# Patient Record
Sex: MALE | Race: WHITE | NOT HISPANIC OR LATINO | Employment: FULL TIME | URBAN - METROPOLITAN AREA
[De-identification: names, ages, dates, MRNs, and addresses within clinical notes are randomized per-mention and may not be internally consistent; named-entity substitution may affect disease eponyms.]

---

## 2017-08-05 ENCOUNTER — HOSPITAL ENCOUNTER (EMERGENCY)
Facility: HOSPITAL | Age: 42
Discharge: HOME | End: 2017-08-05
Admitting: EMERGENCY MEDICINE
Payer: COMMERCIAL

## 2017-08-05 VITALS
OXYGEN SATURATION: 99 % | SYSTOLIC BLOOD PRESSURE: 121 MMHG | BODY MASS INDEX: 24.33 KG/M2 | HEART RATE: 68 BPM | WEIGHT: 155 LBS | DIASTOLIC BLOOD PRESSURE: 68 MMHG | TEMPERATURE: 97.6 F | RESPIRATION RATE: 16 BRPM | HEIGHT: 67 IN

## 2017-08-05 DIAGNOSIS — L29.9 ITCHY SKIN: ICD-10-CM

## 2017-08-05 DIAGNOSIS — L30.9 DERMATITIS: Primary | ICD-10-CM

## 2017-08-05 PROCEDURE — 99282 EMERGENCY DEPT VISIT SF MDM: CPT

## 2017-08-05 RX ORDER — PREDNISONE 10 MG/1
TABLET ORAL
Qty: 30 TABLET | Refills: 0 | Status: SHIPPED | OUTPATIENT
Start: 2017-08-05

## 2017-08-05 RX ORDER — DIPHENHYDRAMINE HCL 25 MG
25 TABLET ORAL EVERY 6 HOURS PRN
Qty: 30 TABLET | Refills: 0 | Status: SHIPPED | OUTPATIENT
Start: 2017-08-05

## 2018-07-16 ENCOUNTER — HOSPITAL ENCOUNTER (EMERGENCY)
Facility: HOSPITAL | Age: 43
Discharge: HOME | End: 2018-07-17
Attending: EMERGENCY MEDICINE | Admitting: EMERGENCY MEDICINE
Payer: COMMERCIAL

## 2018-07-16 VITALS
HEART RATE: 67 BPM | WEIGHT: 165 LBS | DIASTOLIC BLOOD PRESSURE: 73 MMHG | TEMPERATURE: 98.1 F | BODY MASS INDEX: 25.84 KG/M2 | RESPIRATION RATE: 18 BRPM | SYSTOLIC BLOOD PRESSURE: 147 MMHG | OXYGEN SATURATION: 100 %

## 2018-07-16 DIAGNOSIS — H60.02 ABSCESS OF EXTERNAL EAR, LEFT: Primary | ICD-10-CM

## 2018-07-17 ENCOUNTER — APPOINTMENT (EMERGENCY)
Dept: RADIOLOGY | Facility: HOSPITAL | Age: 43
End: 2018-07-17
Payer: COMMERCIAL

## 2018-07-17 LAB
ANION GAP SERPL CALCULATED.3IONS-SCNC: 6 MMOL/L (ref 4–13)
BASOPHILS # BLD AUTO: 0.09 THOUSANDS/ΜL (ref 0–0.1)
BASOPHILS NFR BLD AUTO: 1 % (ref 0–1)
BUN SERPL-MCNC: 17 MG/DL (ref 5–25)
CALCIUM SERPL-MCNC: 8.6 MG/DL (ref 8.3–10.1)
CHLORIDE SERPL-SCNC: 105 MMOL/L (ref 100–108)
CO2 SERPL-SCNC: 28 MMOL/L (ref 21–32)
CREAT SERPL-MCNC: 1.05 MG/DL (ref 0.6–1.3)
EOSINOPHIL # BLD AUTO: 0.41 THOUSAND/ΜL (ref 0–0.61)
EOSINOPHIL NFR BLD AUTO: 4 % (ref 0–6)
ERYTHROCYTE [DISTWIDTH] IN BLOOD BY AUTOMATED COUNT: 13.2 % (ref 11.6–15.1)
GFR SERPL CREATININE-BSD FRML MDRD: 88 ML/MIN/1.73SQ M
GLUCOSE SERPL-MCNC: 110 MG/DL (ref 65–140)
HCT VFR BLD AUTO: 41.4 % (ref 36.5–49.3)
HGB BLD-MCNC: 13.9 G/DL (ref 12–17)
IMM GRANULOCYTES # BLD AUTO: 0.03 THOUSAND/UL (ref 0–0.2)
IMM GRANULOCYTES NFR BLD AUTO: 0 % (ref 0–2)
LYMPHOCYTES # BLD AUTO: 4.71 THOUSANDS/ΜL (ref 0.6–4.47)
LYMPHOCYTES NFR BLD AUTO: 46 % (ref 14–44)
MCH RBC QN AUTO: 31.2 PG (ref 26.8–34.3)
MCHC RBC AUTO-ENTMCNC: 33.6 G/DL (ref 31.4–37.4)
MCV RBC AUTO: 93 FL (ref 82–98)
MONOCYTES # BLD AUTO: 0.8 THOUSAND/ΜL (ref 0.17–1.22)
MONOCYTES NFR BLD AUTO: 8 % (ref 4–12)
NEUTROPHILS # BLD AUTO: 4.21 THOUSANDS/ΜL (ref 1.85–7.62)
NEUTS SEG NFR BLD AUTO: 41 % (ref 43–75)
NRBC BLD AUTO-RTO: 0 /100 WBCS
PLATELET # BLD AUTO: 219 THOUSANDS/UL (ref 149–390)
PMV BLD AUTO: 9.7 FL (ref 8.9–12.7)
POTASSIUM SERPL-SCNC: 3.9 MMOL/L (ref 3.5–5.3)
RBC # BLD AUTO: 4.46 MILLION/UL (ref 3.88–5.62)
SODIUM SERPL-SCNC: 139 MMOL/L (ref 136–145)
WBC # BLD AUTO: 10.25 THOUSAND/UL (ref 4.31–10.16)

## 2018-07-17 PROCEDURE — 36415 COLL VENOUS BLD VENIPUNCTURE: CPT | Performed by: EMERGENCY MEDICINE

## 2018-07-17 PROCEDURE — 80048 BASIC METABOLIC PNL TOTAL CA: CPT | Performed by: EMERGENCY MEDICINE

## 2018-07-17 PROCEDURE — 96360 HYDRATION IV INFUSION INIT: CPT

## 2018-07-17 PROCEDURE — 85025 COMPLETE CBC W/AUTO DIFF WBC: CPT | Performed by: EMERGENCY MEDICINE

## 2018-07-17 PROCEDURE — 99284 EMERGENCY DEPT VISIT MOD MDM: CPT

## 2018-07-17 PROCEDURE — 70487 CT MAXILLOFACIAL W/DYE: CPT

## 2018-07-17 RX ORDER — SULFAMETHOXAZOLE AND TRIMETHOPRIM 800; 160 MG/1; MG/1
1 TABLET ORAL 2 TIMES DAILY
Qty: 14 TABLET | Refills: 0 | Status: SHIPPED | OUTPATIENT
Start: 2018-07-17 | End: 2018-07-24

## 2018-07-17 RX ORDER — CEPHALEXIN 500 MG/1
500 CAPSULE ORAL EVERY 6 HOURS SCHEDULED
Qty: 28 CAPSULE | Refills: 0 | Status: SHIPPED | OUTPATIENT
Start: 2018-07-17 | End: 2018-07-24

## 2018-07-17 RX ORDER — SULFAMETHOXAZOLE AND TRIMETHOPRIM 800; 160 MG/1; MG/1
1 TABLET ORAL ONCE
Status: COMPLETED | OUTPATIENT
Start: 2018-07-17 | End: 2018-07-17

## 2018-07-17 RX ORDER — CEPHALEXIN 250 MG/1
500 CAPSULE ORAL ONCE
Status: COMPLETED | OUTPATIENT
Start: 2018-07-17 | End: 2018-07-17

## 2018-07-17 RX ADMIN — SULFAMETHOXAZOLE AND TRIMETHOPRIM 1 TABLET: 800; 160 TABLET ORAL at 01:30

## 2018-07-17 RX ADMIN — CEPHALEXIN 500 MG: 250 CAPSULE ORAL at 01:30

## 2018-07-17 RX ADMIN — SODIUM CHLORIDE 1000 ML: 0.9 INJECTION, SOLUTION INTRAVENOUS at 00:11

## 2018-07-17 RX ADMIN — IOHEXOL 85 ML: 350 INJECTION, SOLUTION INTRAVENOUS at 00:52

## 2018-07-17 NOTE — DISCHARGE INSTRUCTIONS
Abscess   WHAT YOU NEED TO KNOW:   A warm compress may help your abscess drain  Your healthcare provider may make a cut in the abscess so it can drain  You may need surgery to remove an abscess that is on your hands or buttocks  DISCHARGE INSTRUCTIONS:   Return to the emergency department if:   · The area around your abscess becomes very painful, warm, or has red streaks  · You have a fever and chills  · Your heart is beating faster than usual      · You feel faint or confused  Contact your healthcare provider if:   · Your abscess gets bigger or does not get better  · Your abscess returns  · You have questions or concerns about your condition or care  Medicines: You may  need any of the following:  · Antibiotics  help treat a bacterial infection  · Acetaminophen  decreases pain and fever  It is available without a doctor's order  Ask how much to take and how often to take it  Follow directions  Acetaminophen can cause liver damage if not taken correctly  · NSAIDs , such as ibuprofen, help decrease swelling, pain, and fever  This medicine is available with or without a doctor's order  NSAIDs can cause stomach bleeding or kidney problems in certain people  If you take blood thinner medicine, always ask your healthcare provider if NSAIDs are safe for you  Always read the medicine label and follow directions  · Take your medicine as directed  Contact your healthcare provider if you think your medicine is not helping or if you have side effects  Tell him or her if you are allergic to any medicine  Keep a list of the medicines, vitamins, and herbs you take  Include the amounts, and when and why you take them  Bring the list or the pill bottles to follow-up visits  Carry your medicine list with you in case of an emergency  Self-care:   · Apply a warm compress to your abscess  This will help it open and drain  Wet a washcloth in warm, but not hot, water  Apply the compress for 10 minutes  Repeat this 4 times each day  Do not  press on an abscess or try to open it with a needle  You may push the bacteria deeper or into your blood  · Do not share your clothes, towels, or sheets with anyone  This can spread the infection to others  · Wash your hands often  This can help prevent the spread of germs  Use soap and water or an alcohol-based hand rub  Care for your wound after it is drained:   · Care for your wound as directed  If your healthcare provider says it is okay, carefully remove the bandage and gauze packing  You may need to soak the gauze to get it out of your wound  Clean your wound and the area around it as directed  Dry the area and put on new, clean bandages  Change your bandages when they get wet or dirty  · Ask your healthcare provider how to change the gauze in your wound  Keep track of how many pieces of gauze are placed inside the wound  Do not put too much packing in the wound  Do not pack the gauze too tightly in your wound  Follow up with your healthcare provider in 1 to 3 days: You may need to have your packing removed or your bandage changed  Write down your questions so you remember to ask them during your visits  © 2017 [employer]  Information is for End User's use only and may not be sold, redistributed or otherwise used for commercial purposes  All illustrations and images included in CareNotes® are the copyrighted property of [employer] , [employer]  or Brendon Sylvester  The above information is an  only  It is not intended as medical advice for individual conditions or treatments  Talk to your doctor, nurse or pharmacist before following any medical regimen to see if it is safe and effective for you

## 2018-07-17 NOTE — ED ATTENDING ATTESTATION
Lin Chapman MD, saw and evaluated the patient  I have discussed the patient with the resident/non-physician practitioner and agree with the resident's/non-physician practitioner's findings, Plan of Care, and MDM as documented in the resident's/non-physician practitioner's note, except where noted  All available labs and Radiology studies were reviewed  At this point I agree with the current assessment done in the Emergency Department  I have conducted an independent evaluation of this patient a history and physical is as follows:    OA: 37 y/o m who noticed a small "bump" behind his L ear that has grown progressively in size, painful with radiation to his forehead and into his jaw/face as well  Mildly sore throat but no difficulty swallowing  Attempted to "pop" the area a few days ago and noted increase in size since then  No fevers/chills  No drainage from ear  No neck pain otherwise  Denies IVDU or immunocompromise  + tobacco  PE, well developed m sitting upright in bed in NAD, VSS, NC/AT, MMM, clear sclera/conjunctiva, TM clear b/l, + small dime sized area of fluctuance on posterior auricle without associated erythema or warmth, no ttp over mastoid, + palpable post-auricular LN, posterior oropharynx WNL, neck supple/FROM/-meningismus, no crepitus, RR, lungs CTAB, abd soft, +Bs, -rash, GRANT, intact pulses, AAO  A/p abscess, given associated discomfort in neck/face, obtain imaging, abx, treat accordingly         Critical Care Time  CritCare Time    Procedures

## 2018-07-17 NOTE — ED PROVIDER NOTES
History  Chief Complaint   Patient presents with    Swollen Glands     pt reports swollen lymph nodes behind left ear, states they hurt and he has a left sided sore throat     Patient is a 39 yo M who presents with left ear pain and swelling behind the ear x multiple weeks  Reports that a few weeks ago noticed a small bump behind his left ear that has since been growing in size and after attempting to pop it a few days ago has become painful  Describes pain as throbbing, constant, radiating to the left forehead and down the neck, 7 out of 10 in intensity  Denies any fevers,chills, n/v, trouble swallowing, drainage from the actual ear  Assessment and Plan: left posterior ear swelling with some fluctuance concerning for abscess  As this area is throughout cartilage, hesitant to I and D  Will give patient 1st dose antibiotics in the ED, prescribe antibiotics x 7 days, and have patient follow up with ENT  Also, will get a CT scan of the mastoid- which was negative  Given strict return precautions which patient verbalized understanding of  Prior to Admission Medications   Prescriptions Last Dose Informant Patient Reported? Taking? diphenhydrAMINE (BENADRYL) 25 mg tablet   No No   Sig: Take 1 tablet by mouth every 6 (six) hours as needed for itching   predniSONE 10 mg tablet   No No   Sig: Take 5 tabs on day 1 and 2, take 4 tabs on day 3 and 4, take 3 tabs on day 5 and 6, take 2 tabs on day 7 and 8, take 1 tab on day 9 and 10      Facility-Administered Medications: None       History reviewed  No pertinent past medical history  History reviewed  No pertinent surgical history  History reviewed  No pertinent family history  I have reviewed and agree with the history as documented      Social History   Substance Use Topics    Smoking status: Current Some Day Smoker    Smokeless tobacco: Former User     Quit date: 8/5/2016    Alcohol use No        Review of Systems   Constitutional: Negative for chills and fever  HENT: Positive for ear pain and sore throat  Negative for congestion, drooling, ear discharge, rhinorrhea and trouble swallowing  Eyes: Negative for discharge and itching  Respiratory: Negative for cough, shortness of breath and wheezing  Skin: Negative for pallor and rash  Physical Exam  ED Triage Vitals [07/16/18 2050]   Temperature Pulse Respirations Blood Pressure SpO2   98 1 °F (36 7 °C) 67 18 147/73 100 %      Temp Source Heart Rate Source Patient Position - Orthostatic VS BP Location FiO2 (%)   Oral Monitor Sitting Left arm --      Pain Score       7           Orthostatic Vital Signs  Vitals:    07/16/18 2050   BP: 147/73   Pulse: 67   Patient Position - Orthostatic VS: Sitting       Physical Exam   Constitutional: He is oriented to person, place, and time  He appears well-developed and well-nourished  No distress  HENT:   Head: Normocephalic and atraumatic  Nose: Nose normal    Mouth/Throat: Oropharynx is clear and moist  No oropharyngeal exudate  Left area of fluctuance over the posterior aspect of the antihelix  No drainage  Tender over area of fluctuance  No surrounding erythema  No erythema or induration over the mastoid  No tenderness over mastoid  Eyes: Conjunctivae and EOM are normal  Pupils are equal, round, and reactive to light  Neck: Normal range of motion and full passive range of motion without pain  Neck supple  No spinous process tenderness and no muscular tenderness present  No neck rigidity  No edema, no erythema and normal range of motion present  No Brudzinski's sign and no Kernig's sign noted  Cardiovascular: Normal rate, regular rhythm, normal heart sounds and intact distal pulses  Exam reveals no gallop and no friction rub  No murmur heard  Pulmonary/Chest: Effort normal and breath sounds normal  No respiratory distress  He has no wheezes  He has no rales  He exhibits no tenderness  Abdominal: Soft   Bowel sounds are normal  He exhibits no distension  There is no tenderness  Musculoskeletal: Normal range of motion  He exhibits no edema or tenderness  Neurological: He is alert and oriented to person, place, and time  Skin: Skin is warm and dry  He is not diaphoretic  No erythema  Psychiatric: He has a normal mood and affect  His behavior is normal    Nursing note and vitals reviewed  ED Medications  Medications   sodium chloride 0 9 % bolus 1,000 mL (0 mL Intravenous Stopped 7/17/18 0125)   iohexol (OMNIPAQUE) 350 MG/ML injection (MULTI-DOSE) 85 mL (85 mL Intravenous Given 7/17/18 0052)   cephalexin (KEFLEX) capsule 500 mg (500 mg Oral Given 7/17/18 0130)   sulfamethoxazole-trimethoprim (BACTRIM DS) 800-160 mg per tablet 1 tablet (1 tablet Oral Given 7/17/18 0130)       Diagnostic Studies  Results Reviewed     Procedure Component Value Units Date/Time    Basic metabolic panel [[id number]] Collected:  07/17/18 0010    Lab Status:  Final result Specimen:  Blood from Arm, Right Updated:  07/17/18 0034     Sodium 139 mmol/L      Potassium 3 9 mmol/L      Chloride 105 mmol/L      CO2 28 mmol/L      Anion Gap 6 mmol/L      BUN 17 mg/dL      Creatinine 1 05 mg/dL      Glucose 110 mg/dL      Calcium 8 6 mg/dL      eGFR 88 ml/min/1 73sq m     Narrative:         National Kidney Disease Education Program recommendations are as follows:  GFR calculation is accurate only with a steady state creatinine  Chronic Kidney disease less than 60 ml/min/1 73 sq  meters  Kidney failure less than 15 ml/min/1 73 sq  meters      CBC and differential [[id number]]  (Abnormal) Collected:  07/17/18 0011    Lab Status:  Final result Specimen:  Blood from Arm, Right Updated:  07/17/18 0019     WBC 10 25 (H) Thousand/uL      RBC 4 46 Million/uL      Hemoglobin 13 9 g/dL      Hematocrit 41 4 %      MCV 93 fL      MCH 31 2 pg      MCHC 33 6 g/dL      RDW 13 2 %      MPV 9 7 fL      Platelets 788 Thousands/uL      nRBC 0 /100 WBCs      Neutrophils Relative 41 (L) % Immat GRANS % 0 %      Lymphocytes Relative 46 (H) %      Monocytes Relative 8 %      Eosinophils Relative 4 %      Basophils Relative 1 %      Neutrophils Absolute 4 21 Thousands/µL      Immature Grans Absolute 0 03 Thousand/uL      Lymphocytes Absolute 4 71 (H) Thousands/µL      Monocytes Absolute 0 80 Thousand/µL      Eosinophils Absolute 0 41 Thousand/µL      Basophils Absolute 0 09 Thousands/µL                  CT facial bones with contrast   Final Result by Scott Mirza DO (07/17 9703)      Partial opacification of the left frontal sinus and ethmoid air cells suggestive of sinus disease  Workstation performed: [id number]               Procedures  Procedures      Phone Consults  ED Phone Contact    ED Course                               MDM  CritCare Time    Disposition  Final diagnoses:   Abscess of external ear, left     Time reflects when diagnosis was documented in both MDM as applicable and the Disposition within this note     Time User Action Codes Description Comment    7/17/2018  1:16 AM Dung Stuart Add [H60 02] Abscess of external ear, left       ED Disposition     ED Disposition Condition Comment    Discharge  Rosa Barrios discharge to home/self care  Condition at discharge: Good        Follow-up Information     Follow up With Specialties Details Why Contact Info Additional Information    Laura Palma MD Otolaryngology Schedule an appointment as soon as possible for a visit in 3 days for re-evaluation [address] [facility]    [address] Celia Mireles DO Family Medicine Go in 1 week for re-evaluation [address] [facility]  [phone]       [facility] Emergency Department Emergency Medicine Go to for re-evaluation, If symptoms worsen, As needed [address] [facility], [address], [place], [address]          Patient's Medications   Discharge Prescriptions    CEPHALEXIN (KEFLEX) 500 MG CAPSULE    Take 1 capsule (500 mg total) by mouth every 6 (six) hours for 7 days       Start Date: 7/17/2018 End Date: 7/24/2018       Order Dose: 500 mg       Quantity: 28 capsule    Refills: 0    SULFAMETHOXAZOLE-TRIMETHOPRIM (BACTRIM DS) 800-160 MG PER TABLET    Take 1 tablet by mouth 2 (two) times a day for 7 days smx-tmp DS (BACTRIM) 800-160 mg tabs (1tab q12 D10)       Start Date: 7/17/2018 End Date: 7/24/2018       Order Dose: 1 tablet       Quantity: 14 tablet    Refills: 0     No discharge procedures on file  ED Provider  Attending physically available and evaluated Abelardo Paula I managed the patient along with the ED Attending      Electronically Signed by         Pernell Carbajal DO  07/17/18 0131

## 2018-07-23 RX ORDER — METHOCARBAMOL 750 MG/1
750 TABLET, FILM COATED ORAL 4 TIMES DAILY
COMMUNITY
Start: 2017-09-19 | End: 2018-09-20 | Stop reason: ALTCHOICE

## 2018-07-24 ENCOUNTER — OFFICE VISIT (OUTPATIENT)
Dept: FAMILY MEDICINE CLINIC | Facility: CLINIC | Age: 43
End: 2018-07-24
Payer: COMMERCIAL

## 2018-07-24 VITALS
DIASTOLIC BLOOD PRESSURE: 78 MMHG | BODY MASS INDEX: 24.23 KG/M2 | HEART RATE: 64 BPM | RESPIRATION RATE: 16 BRPM | WEIGHT: 154.38 LBS | SYSTOLIC BLOOD PRESSURE: 118 MMHG | TEMPERATURE: 98.4 F | HEIGHT: 67 IN

## 2018-07-24 DIAGNOSIS — H60.03: Primary | ICD-10-CM

## 2018-07-24 DIAGNOSIS — K13.79 DISORDER OF UVULA: ICD-10-CM

## 2018-07-24 PROCEDURE — 99203 OFFICE O/P NEW LOW 30 MIN: CPT | Performed by: NURSE PRACTITIONER

## 2018-07-24 NOTE — PROGRESS NOTES
Chief Complaint   Patient presents with   [address] [facility]     Patient present today for a lump behind his left ear  per patient he had a spider bite about 1 month ago and since then he is having this lump on and off  Per patient he is also having right ear discomfort and he can hear a pop inside his ears   Headache     Patient also complaints of on and off Headaches for the last months  Assessment/Plan:     Diagnoses and all orders for this visit:    Abscess of both external ears  -     Ambulatory Referral to Otolaryngology; Future    Disorder of uvula  -     Ambulatory Referral to Otolaryngology; Future          Subjective:      Patient ID: Guinevere Duane is a 38 y o  male  Patient was at the ER due to possible spider bite and left ear swelling and neck pain  There performed CT facial bones which was negative for abscess  He is on antibiotics  The swelling has gone down on the left but is now starting on the right  patient states has had problems in the past with left tonsil  He had an injury while eating doritos as a teenager that he acquired laceration to the uvula and the left tonsil and posterior pharynx  Never repaired or evaluated  Currently also complains of tonsil stones to the left tonsil  The following portions of the patient's history were reviewed and updated as appropriate: allergies, current medications, past family history, past medical history, past social history, past surgical history and problem list     Review of Systems   Constitutional: Negative for fatigue and fever  HENT: Negative for trouble swallowing  Ear pain on external posterior cartilage swelling  Respiratory: Negative for cough, chest tightness and shortness of breath  Musculoskeletal: Positive for myalgias and neck pain  Hematological: Positive for adenopathy           Objective:      /78 (BP Location: Right arm, Patient Position: Sitting, Cuff Size: Standard)   Pulse 64   Temp 98 4 °F (36 9 °C) (Temporal)   Resp 16   Ht 5' 7" (1 702 m)   Wt 70 kg (154 lb 6 oz)   BMI 24 18 kg/m²          Physical Exam   Constitutional: He appears well-developed and well-nourished  HENT:   Right Ear: There is swelling (external ear)  Left Ear: There is swelling (external ear )  Mouth/Throat: No oropharyngeal exudate or posterior oropharyngeal erythema  Cardiovascular: Normal rate, regular rhythm, S1 normal, S2 normal and normal heart sounds  No murmur heard  Pulmonary/Chest: Effort normal and breath sounds normal    Lymphadenopathy:     He has cervical adenopathy  Left cervical: Superficial cervical adenopathy present

## 2018-09-20 ENCOUNTER — OFFICE VISIT (OUTPATIENT)
Dept: MULTI SPECIALTY CLINIC | Facility: CLINIC | Age: 43
End: 2018-09-20
Payer: COMMERCIAL

## 2018-09-20 VITALS
DIASTOLIC BLOOD PRESSURE: 84 MMHG | WEIGHT: 159.61 LBS | SYSTOLIC BLOOD PRESSURE: 118 MMHG | BODY MASS INDEX: 25.05 KG/M2 | HEIGHT: 67 IN

## 2018-09-20 DIAGNOSIS — K13.79 DISORDER OF UVULA: ICD-10-CM

## 2018-09-20 DIAGNOSIS — Q18.1 CYST ON EAR: Primary | ICD-10-CM

## 2018-09-20 PROCEDURE — 99213 OFFICE O/P EST LOW 20 MIN: CPT | Performed by: OTOLARYNGOLOGY

## 2018-09-20 NOTE — PROGRESS NOTES
Assessment/Plan:       Diagnoses and all orders for this visit:    Cyst on ear  Comments:  Bilat    Disorder of uvula  -     Ambulatory Referral to Otolaryngology          Subjective:      Patient ID: Eliu Arthur is a 38 y o  male  Patient complains of lumps on the back of his ears that are getting infected  Patient was given 2 different antibiotics that he finished one month ago  Denies pain or discharge  at this time  The following portions of the patient's history were reviewed and updated as appropriate: allergies, current medications, past family history, past medical history, past social history, past surgical history and problem list     Review of Systems   Constitutional: Negative  HENT: Positive for congestion (Patient states nose and ear) and ear discharge  Negative for ear pain  Respiratory: Negative  Cardiovascular: Negative  Neurological: Positive for dizziness and headaches  Objective:      /84 (BP Location: Left arm, Patient Position: Sitting, Cuff Size: Adult)   Ht 5' 7" (1 702 m)   Wt 72 4 kg (159 lb 9 8 oz)   BMI 25 00 kg/m²            Physical Exam   Constitutional: Oriented to person, place, and time  Well-developed and well-nourished, no apparent distress, non-toxic appearance  Cooperative, able to hear and answer questions without difficulty  Voice: Normal voice quality  Head: Normocephalic, atraumatic  No scars, masses or lesions  Face: Symmetric, no edema, no sinus tenderness     Right Ear: External ear normal   Auditory canal clear  Tympanic membrane well-appearing, without retraction or scarring  No fluid present  Posterior ear lobe with small  Cyst like lesion approx 8 mm- no redness or tenderness; no cellulitis  Left Ear: External ear normal   Auditory canal clear  Tympanic membrane well-appearing, without retraction or scarring  No fluid present   Cyst like lesion approx 8 mm - no redness or tenderness; no cellulitis  No evidence of infection bilat    Uvular papillomatous lesion at tip of uvula approx 1 cm    Skin: Skin is warm and dry  Psychiatric: Normal mood and affect

## 2020-09-29 ENCOUNTER — TELEPHONE (OUTPATIENT)
Dept: FAMILY MEDICINE CLINIC | Facility: CLINIC | Age: 45
End: 2020-09-29

## 2020-09-29 NOTE — LETTER
09/29/20      [facility]  [address] [facility]  1975    Dear Paul Bautista    Records from Insurance indicate that you are a patient of CHRISTIANO Leach with [facility], [facility]   Please call the office to establish care and schedule your annual physical today at [phone]   If you are seeing a primary care provider other than the one assigned to you by your insurance, you can simply call the number on the back of your insurance card to change your primary care physician with your insurance company  We thank you for choosing [address] [place] [address] for your healthcare needs      Sincerely,      [facility] staff

## 2020-09-29 NOTE — TELEPHONE ENCOUNTER
I attemtped to reach out to pt to find out if we are still his PCP  Pt's Telephone numbers on file are all NON- WORKING numbers  Certified Letter sent

## 2022-08-10 ENCOUNTER — APPOINTMENT (EMERGENCY)
Dept: CT IMAGING | Facility: HOSPITAL | Age: 47
End: 2022-08-10
Payer: COMMERCIAL

## 2022-08-10 ENCOUNTER — HOSPITAL ENCOUNTER (EMERGENCY)
Facility: HOSPITAL | Age: 47
Discharge: HOME | End: 2022-08-10
Attending: EMERGENCY MEDICINE
Payer: COMMERCIAL

## 2022-08-10 VITALS
WEIGHT: 151.01 LBS | HEART RATE: 55 BPM | BODY MASS INDEX: 23.65 KG/M2 | TEMPERATURE: 97.8 F | DIASTOLIC BLOOD PRESSURE: 70 MMHG | SYSTOLIC BLOOD PRESSURE: 120 MMHG | OXYGEN SATURATION: 97 % | RESPIRATION RATE: 20 BRPM

## 2022-08-10 DIAGNOSIS — M79.10 MYALGIA: Primary | ICD-10-CM

## 2022-08-10 DIAGNOSIS — R42 LIGHTHEADEDNESS: ICD-10-CM

## 2022-08-10 PROBLEM — F10.230 ALCOHOL WITHDRAWAL SYNDROME WITHOUT COMPLICATION (HCC): Status: ACTIVE | Noted: 2022-08-10

## 2022-08-10 PROBLEM — F10.930 ALCOHOL WITHDRAWAL SYNDROME WITHOUT COMPLICATION (HCC): Status: ACTIVE | Noted: 2022-08-10

## 2022-08-10 LAB
2HR DELTA HS TROPONIN: 0 NG/L
ALBUMIN SERPL BCP-MCNC: 4.3 G/DL (ref 3.5–5)
ALP SERPL-CCNC: 60 U/L (ref 46–116)
ALT SERPL W P-5'-P-CCNC: 16 U/L (ref 12–78)
ANION GAP SERPL CALCULATED.3IONS-SCNC: 12 MMOL/L (ref 4–13)
AST SERPL W P-5'-P-CCNC: 18 U/L (ref 5–45)
BASOPHILS # BLD AUTO: 0.06 THOUSANDS/ΜL (ref 0–0.1)
BASOPHILS NFR BLD AUTO: 1 % (ref 0–1)
BILIRUB DIRECT SERPL-MCNC: 0.19 MG/DL (ref 0–0.2)
BILIRUB SERPL-MCNC: 1.03 MG/DL (ref 0.2–1)
BILIRUB UR QL STRIP: NEGATIVE
BUN SERPL-MCNC: 11 MG/DL (ref 5–25)
CALCIUM SERPL-MCNC: 9.1 MG/DL (ref 8.3–10.1)
CARDIAC TROPONIN I PNL SERPL HS: 3 NG/L
CARDIAC TROPONIN I PNL SERPL HS: 3 NG/L
CHLORIDE SERPL-SCNC: 105 MMOL/L (ref 96–108)
CK SERPL-CCNC: 136 U/L (ref 39–308)
CLARITY UR: CLEAR
CO2 SERPL-SCNC: 24 MMOL/L (ref 21–32)
COLOR UR: YELLOW
CREAT SERPL-MCNC: 1.01 MG/DL (ref 0.6–1.3)
D DIMER PPP FEU-MCNC: 0.32 UG/ML FEU
EOSINOPHIL # BLD AUTO: 0.26 THOUSAND/ΜL (ref 0–0.61)
EOSINOPHIL NFR BLD AUTO: 2 % (ref 0–6)
ERYTHROCYTE [DISTWIDTH] IN BLOOD BY AUTOMATED COUNT: 12.7 % (ref 11.6–15.1)
FLUAV RNA RESP QL NAA+PROBE: NEGATIVE
FLUBV RNA RESP QL NAA+PROBE: NEGATIVE
GFR SERPL CREATININE-BSD FRML MDRD: 90 ML/MIN/1.73SQ M
GLUCOSE SERPL-MCNC: 86 MG/DL (ref 65–140)
GLUCOSE UR STRIP-MCNC: NEGATIVE MG/DL
HCT VFR BLD AUTO: 43.2 % (ref 36.5–49.3)
HGB BLD-MCNC: 15.2 G/DL (ref 12–17)
HGB UR QL STRIP.AUTO: NEGATIVE
IMM GRANULOCYTES # BLD AUTO: 0.03 THOUSAND/UL (ref 0–0.2)
IMM GRANULOCYTES NFR BLD AUTO: 0 % (ref 0–2)
KETONES UR STRIP-MCNC: NEGATIVE MG/DL
LACTATE SERPL-SCNC: 0.5 MMOL/L (ref 0.5–2)
LEUKOCYTE ESTERASE UR QL STRIP: NEGATIVE
LYMPHOCYTES # BLD AUTO: 4.18 THOUSANDS/ΜL (ref 0.6–4.47)
LYMPHOCYTES NFR BLD AUTO: 38 % (ref 14–44)
MCH RBC QN AUTO: 31.8 PG (ref 26.8–34.3)
MCHC RBC AUTO-ENTMCNC: 35.2 G/DL (ref 31.4–37.4)
MCV RBC AUTO: 90 FL (ref 82–98)
MONOCYTES # BLD AUTO: 0.54 THOUSAND/ΜL (ref 0.17–1.22)
MONOCYTES NFR BLD AUTO: 5 % (ref 4–12)
NEUTROPHILS # BLD AUTO: 5.86 THOUSANDS/ΜL (ref 1.85–7.62)
NEUTS SEG NFR BLD AUTO: 54 % (ref 43–75)
NITRITE UR QL STRIP: NEGATIVE
NRBC BLD AUTO-RTO: 0 /100 WBCS
PH UR STRIP.AUTO: 6 [PH]
PLATELET # BLD AUTO: 252 THOUSANDS/UL (ref 149–390)
PMV BLD AUTO: 9.3 FL (ref 8.9–12.7)
POTASSIUM SERPL-SCNC: 3.7 MMOL/L (ref 3.5–5.3)
PROT SERPL-MCNC: 7.5 G/DL (ref 6.4–8.4)
PROT UR STRIP-MCNC: NEGATIVE MG/DL
RBC # BLD AUTO: 4.78 MILLION/UL (ref 3.88–5.62)
RSV RNA RESP QL NAA+PROBE: NEGATIVE
SARS-COV-2 RNA RESP QL NAA+PROBE: NEGATIVE
SODIUM SERPL-SCNC: 141 MMOL/L (ref 135–147)
SP GR UR STRIP.AUTO: 1.02 (ref 1–1.03)
UROBILINOGEN UR QL STRIP.AUTO: 0.2 E.U./DL
WBC # BLD AUTO: 10.93 THOUSAND/UL (ref 4.31–10.16)

## 2022-08-10 PROCEDURE — 80048 BASIC METABOLIC PNL TOTAL CA: CPT | Performed by: EMERGENCY MEDICINE

## 2022-08-10 PROCEDURE — 85379 FIBRIN DEGRADATION QUANT: CPT | Performed by: EMERGENCY MEDICINE

## 2022-08-10 PROCEDURE — 96374 THER/PROPH/DIAG INJ IV PUSH: CPT

## 2022-08-10 PROCEDURE — 0241U HB NFCT DS VIR RESP RNA 4 TRGT: CPT | Performed by: EMERGENCY MEDICINE

## 2022-08-10 PROCEDURE — 96361 HYDRATE IV INFUSION ADD-ON: CPT

## 2022-08-10 PROCEDURE — 99284 EMERGENCY DEPT VISIT MOD MDM: CPT | Performed by: EMERGENCY MEDICINE

## 2022-08-10 PROCEDURE — 84484 ASSAY OF TROPONIN QUANT: CPT | Performed by: EMERGENCY MEDICINE

## 2022-08-10 PROCEDURE — 82550 ASSAY OF CK (CPK): CPT | Performed by: EMERGENCY MEDICINE

## 2022-08-10 PROCEDURE — 81003 URINALYSIS AUTO W/O SCOPE: CPT | Performed by: EMERGENCY MEDICINE

## 2022-08-10 PROCEDURE — 93005 ELECTROCARDIOGRAM TRACING: CPT

## 2022-08-10 PROCEDURE — 99285 EMERGENCY DEPT VISIT HI MDM: CPT

## 2022-08-10 PROCEDURE — 36415 COLL VENOUS BLD VENIPUNCTURE: CPT | Performed by: EMERGENCY MEDICINE

## 2022-08-10 PROCEDURE — 96375 TX/PRO/DX INJ NEW DRUG ADDON: CPT

## 2022-08-10 PROCEDURE — 70450 CT HEAD/BRAIN W/O DYE: CPT

## 2022-08-10 PROCEDURE — 80076 HEPATIC FUNCTION PANEL: CPT | Performed by: EMERGENCY MEDICINE

## 2022-08-10 PROCEDURE — 85025 COMPLETE CBC W/AUTO DIFF WBC: CPT | Performed by: EMERGENCY MEDICINE

## 2022-08-10 PROCEDURE — 83605 ASSAY OF LACTIC ACID: CPT | Performed by: EMERGENCY MEDICINE

## 2022-08-10 RX ORDER — DIPHENHYDRAMINE HYDROCHLORIDE 50 MG/ML
25 INJECTION INTRAMUSCULAR; INTRAVENOUS ONCE
Status: COMPLETED | OUTPATIENT
Start: 2022-08-10 | End: 2022-08-10

## 2022-08-10 RX ORDER — METOCLOPRAMIDE HYDROCHLORIDE 5 MG/ML
10 INJECTION INTRAMUSCULAR; INTRAVENOUS ONCE
Status: COMPLETED | OUTPATIENT
Start: 2022-08-10 | End: 2022-08-10

## 2022-08-10 RX ORDER — KETOROLAC TROMETHAMINE 30 MG/ML
15 INJECTION, SOLUTION INTRAMUSCULAR; INTRAVENOUS ONCE
Status: COMPLETED | OUTPATIENT
Start: 2022-08-10 | End: 2022-08-10

## 2022-08-10 RX ADMIN — DIPHENHYDRAMINE HYDROCHLORIDE 25 MG: 50 INJECTION, SOLUTION INTRAMUSCULAR; INTRAVENOUS at 19:44

## 2022-08-10 RX ADMIN — KETOROLAC TROMETHAMINE 15 MG: 30 INJECTION, SOLUTION INTRAMUSCULAR at 19:44

## 2022-08-10 RX ADMIN — SODIUM CHLORIDE 1000 ML: 0.9 INJECTION, SOLUTION INTRAVENOUS at 19:44

## 2022-08-10 RX ADMIN — METOCLOPRAMIDE 10 MG: 5 INJECTION, SOLUTION INTRAMUSCULAR; INTRAVENOUS at 19:44

## 2022-08-10 NOTE — Clinical Note
Chari De Jesus was seen and treated in our emergency department on 8/10/2022  Diagnosis:     Florestine Esters  may return to work on return date  He may return on this date: 08/13/2022         If you have any questions or concerns, please don't hesitate to call        Alvina Leo MD    ______________________________           _______________          _______________  Hospital Representative                              Date                                Time

## 2022-08-10 NOTE — ED PROVIDER NOTES
History  Chief Complaint   Patient presents with    Dizziness     Dizziness headache and blurred vision with cramps all over x 2 5 wks after working outside in heat, also stopped taking thinners 1 yr ago        History provided by:  Patient   used: No    Medical Problem  Location:  Generalized  Quality:  "I think i have heat exhaustion"  Severity:  Moderate  Onset quality:  Gradual  Duration:  1 week  Timing:  Constant  Progression:  Waxing and waning  Chronicity:  New  Context:  "I work outside in the heat"  Relieved by:  Nothing  Worsened by:  Exertion  Ineffective treatments:  Drinking a lot of water  Associated symptoms: fatigue, headaches and myalgias    Associated symptoms: no abdominal pain, no chest pain, no cough, no ear pain, no nausea, no rash, no shortness of breath, no sore throat and no vomiting  Fever: "I work outside in the heat"        Prior to Admission Medications   Prescriptions Last Dose Informant Patient Reported? Taking? diphenhydrAMINE (BENADRYL) 25 mg tablet   No No   Sig: Take 1 tablet by mouth every 6 (six) hours as needed for itching   Patient not taking: Reported on 9/20/2018    predniSONE 10 mg tablet   No No   Sig: Take 5 tabs on day 1 and 2, take 4 tabs on day 3 and 4, take 3 tabs on day 5 and 6, take 2 tabs on day 7 and 8, take 1 tab on day 9 and 10   Patient not taking: Reported on 9/20/2018       Facility-Administered Medications: None       Past Medical History:   Diagnosis Date    DVT (deep venous thrombosis) ([facility] [address] )        No past surgical history on file  Family History   Problem Relation Age of Onset    No Known Problems Mother     No Known Problems Father      I have reviewed and agree with the history as documented      E-Cigarette/Vaping     E-Cigarette/Vaping Substances     Social History     Tobacco Use    Smoking status: Current Some Day Smoker    Smokeless tobacco: Former User     Quit date: 8/5/2016   Substance Use Topics    Alcohol use: No    Drug use: No       Review of Systems   Constitutional: Positive for fatigue  Negative for chills  Fever: "I work outside in the heat"   HENT: Negative for ear pain and sore throat  Eyes: Negative for pain and visual disturbance  Respiratory: Negative for cough and shortness of breath  Cardiovascular: Negative for chest pain and palpitations  Gastrointestinal: Negative for abdominal pain, nausea and vomiting  Genitourinary: Negative for dysuria and hematuria  Musculoskeletal: Positive for arthralgias and myalgias  Negative for back pain  Skin: Negative for color change and rash  Neurological: Positive for syncope, weakness, light-headedness and headaches  Negative for seizures  All other systems reviewed and are negative  Physical Exam  Physical Exam  Vitals and nursing note reviewed  Constitutional:       Appearance: He is well-developed  HENT:      Head: Normocephalic and atraumatic  Eyes:      Conjunctiva/sclera: Conjunctivae normal    Cardiovascular:      Rate and Rhythm: Normal rate and regular rhythm  Heart sounds: No murmur heard  Pulmonary:      Effort: Pulmonary effort is normal  No respiratory distress  Breath sounds: Normal breath sounds  Abdominal:      Palpations: Abdomen is soft  Tenderness: There is no abdominal tenderness  Musculoskeletal:      Cervical back: Neck supple  Skin:     General: Skin is warm and dry  Neurological:      General: No focal deficit present  Mental Status: He is alert and oriented to person, place, and time  Mental status is at baseline  Cranial Nerves: No cranial nerve deficit  Sensory: No sensory deficit  Motor: No weakness        Coordination: Coordination normal       Gait: Gait normal          Vital Signs  ED Triage Vitals [08/10/22 1810]   Temperature Pulse Respirations Blood Pressure SpO2   97 8 °F (36 6 °C) 70 18 (!) 180/90 98 %      Temp src Heart Rate Source Patient Position - Orthostatic VS BP Location FiO2 (%)   -- -- -- -- --      Pain Score       --           Vitals:    08/10/22 1810   BP: (!) 180/90   Pulse: 70         Visual Acuity      ED Medications  Medications   sodium chloride 0 9 % bolus 1,000 mL (has no administration in time range)   metoclopramide (REGLAN) injection 10 mg (has no administration in time range)   ketorolac (TORADOL) injection 15 mg (has no administration in time range)   diphenhydrAMINE (BENADRYL) injection 25 mg (has no administration in time range)       Diagnostic Studies  Results Reviewed     Procedure Component Value Units Date/Time    D-Dimer [[id number]]     Lab Status: No result Specimen: Blood     Basic metabolic panel [[id number]]     Lab Status: No result Specimen: Blood     Hepatic function panel [[id number]]     Lab Status: No result Specimen: Blood     CBC and differential [[id number]]     Lab Status: No result Specimen: Blood     HS Troponin 0hr (reflex protocol) [[id number]]     Lab Status: No result Specimen: Blood     Lactic acid [[id number]]     Lab Status: No result Specimen: Blood     CK Total with Reflex CKMB [[id number]]     Lab Status: No result Specimen: Blood     UA (URINE) with reflex to Scope [[id number]]     Lab Status: No result Specimen: Urine     FLU/RSV/COVID - if FLU/RSV clinically relevant [[id number]]     Lab Status: No result Specimen: Nares from Nose                  CT head without contrast    (Results Pending)              Procedures  Procedures         ED Course                               SBIRT 22yo+    Flowsheet Row Most Recent Value   SBIRT (25 yo +)    In order to provide better care to our patients, we are screening all of our patients for alcohol and drug use  Would it be okay to ask you these screening questions? Yes Filed at: 08/10/2022 1920   Initial Alcohol Screen: US AUDIT-C     1  How often do you have a drink containing alcohol? 1 Filed at: 08/10/2022 1920   2   How many drinks containing alcohol do you have on a typical day you are drinking? 1 Filed at: 08/10/2022 1920   3b  FEMALE Any Age, or MALE 65+: How often do you have 4 or more drinks on one occassion? 0 Filed at: 08/10/2022 1920   Audit-C Score 2 Filed at: 08/10/2022 1920   FERNANDO: How many times in the past year have you    Used an illegal drug or used a prescription medication for non-medical reasons? Never Filed at: 08/10/2022 1920                    MDM    Disposition  Final diagnoses:   None     ED Disposition     None      Follow-up Information    None         Patient's Medications   Discharge Prescriptions    No medications on file       No discharge procedures on file      PDMP Review     None          ED Provider  Electronically Signed by Act, 21 [place]  765TBY-8(X)(4), unless the authorization is terminated  or revoked sooner  The test has been validated but independent review by FDA  and CLIA is pending  Test performed using [employer] GeneXpert: This RT-PCR assay targets N2,  a region unique to SARS-CoV-2  A conserved region in the E-gene was chosen  for pan-Sarbecovirus detection which includes SARS-CoV-2  HS Troponin 0hr (reflex protocol) [[id number]]  (Normal) Collected: 08/10/22 1938    Lab Status: Final result Specimen: Blood from Arm, Right Updated: 08/10/22 2020     hs TnI 0hr 3 ng/L     D-Dimer [[id number]]  (Normal) Collected: 08/10/22 1948    Lab Status: Final result Specimen: Blood from Arm, Right Updated: 08/10/22 2019     D-Dimer, Quant 0 32 ug/ml FEU     Lactic acid [[id number]]  (Normal) Collected: 08/10/22 1938    Lab Status: Final result Specimen: Blood from Arm, Right Updated: 08/10/22 2017     LACTIC ACID 0 5 mmol/L     Narrative:      Result may be elevated if tourniquet was used during collection      Basic metabolic panel [[id number]] Collected: 08/10/22 1938    Lab Status: Final result Specimen: Blood from Arm, Right Updated: 08/10/22 2013     Sodium 141 mmol/L      Potassium 3 7 mmol/L      Chloride 105 mmol/L      CO2 24 mmol/L      ANION GAP 12 mmol/L      BUN 11 mg/dL      Creatinine 1 01 mg/dL      Glucose 86 mg/dL      Calcium 9 1 mg/dL      eGFR 90 ml/min/1 73sq m     Narrative:      [place] guidelines for Chronic Kidney Disease (CKD):     Stage 1 with normal or high GFR (GFR > 90 mL/min/1 73 square meters)    Stage 2 Mild CKD (GFR = 60-89 mL/min/1 73 square meters)    Stage 3A Moderate CKD (GFR = 45-59 mL/min/1 73 square meters)    Stage 3B Moderate CKD (GFR = 30-44 mL/min/1 73 square meters)    Stage 4 Severe CKD (GFR = 15-29 mL/min/1 73 square meters)    Stage 5 End Stage CKD (GFR <15 mL/min/1 73 square meters)  Note: GFR calculation is accurate only with a steady state creatinine    CBC and differential [[id number]]  (Abnormal) Collected: 08/10/22 1938    Lab Status: Final result Specimen: Blood from Arm, Right Updated: 08/10/22 1952     WBC 10 93 Thousand/uL      RBC 4 78 Million/uL      Hemoglobin 15 2 g/dL      Hematocrit 43 2 %      MCV 90 fL      MCH 31 8 pg      MCHC 35 2 g/dL      RDW 12 7 %      MPV 9 3 fL      Platelets 890 Thousands/uL      nRBC 0 /100 WBCs      Neutrophils Relative 54 %      Immat GRANS % 0 %      Lymphocytes Relative 38 %      Monocytes Relative 5 %      Eosinophils Relative 2 %      Basophils Relative 1 %      Neutrophils Absolute 5 86 Thousands/µL      Immature Grans Absolute 0 03 Thousand/uL      Lymphocytes Absolute 4 18 Thousands/µL      Monocytes Absolute 0 54 Thousand/µL      Eosinophils Absolute 0 26 Thousand/µL      Basophils Absolute 0 06 Thousands/µL                  CT head without contrast   Final Result by Donna Lancaster MD (08/10 1956)      No acute intracranial abnormality  Chronic pansinusitis with diffuse mucosal thickening  No air-fluid levels to suggest an acute process  Workstation performed: [id number]                    Procedures  Procedures         ED Course                               SBIRT 22yo+    Flowsheet Row Most Recent Value   SBIRT (23 yo +)    In order to provide better care to our patients, we are screening all of our patients for alcohol and drug use  Would it be okay to ask you these screening questions? Yes Filed at: 08/10/2022 1920   Initial Alcohol Screen: US AUDIT-C     1  How often do you have a drink containing alcohol? 1 Filed at: 08/10/2022 1920   2  How many drinks containing alcohol do you have on a typical day you are drinking? 1 Filed at: 08/10/2022 1920   3b  FEMALE Any Age, or MALE 65+: How often do you have 4 or more drinks on one occassion? 0 Filed at: 08/10/2022 1920   Audit-C Score 2 Filed at: 08/10/2022 1920   FERNANDO: How many times in the past year have you        Used an illegal drug or used a prescription medication for non-medical reasons? Never Filed at: 08/10/2022 1920                    MDM  Number of Diagnoses or Management Options  Lightheadedness: new and requires workup  Myalgia: new and requires workup  Diagnosis management comments: Reports more than two weeks of non-specific sensation of "just feeling off " Describes general fatigue and mild generalized aches without overt, localizing "pain " generalized feeling of lightheadedness, feeling "out of it," and "off balance" but has been able to work, no clear/distinct episodes of aphasia or confusion  Has stopped taking rx meds including AC that he was on for DVT months or more ago but denies unilateral leg swelling, sob, or leg pain  Pt attributes his symptoms to dehydration from intense physical exertion "back when it was really hot " fortunately labs and VS here today are reassuring  Will plan for dc with pcp f/u and return precautions  Amount and/or Complexity of Data Reviewed  Clinical lab tests: reviewed and ordered  Tests in the radiology section of CPT®: reviewed and ordered  Review and summarize past medical records: yes  Discuss the patient with other providers: yes  Independent visualization of images, tracings, or specimens: yes        Disposition  Final diagnoses:   Myalgia   Lightheadedness     Time reflects when diagnosis was documented in both MDM as applicable and the Disposition within this note     Time User Action Codes Description Comment    8/10/2022 10:02 PM Brooke Stephens Add [[id number]] Myalgia     8/10/2022 10:02 PM Jaylin Smith       ED Disposition     ED Disposition   Discharge    Condition   Stable    Date/Time   Wed Aug 10, 2022 10:02 PM    Comment   Jimbo Winter discharge to home/self care                 Follow-up Information     Follow up With Specialties Details Why [address], [address], Nurse Practitioner   [address] [facility]  [address] [facility] [address] [place] [address]  [phone]      Priscilla Snyder, [address] [facility], Nurse Practitioner   [address] [facility] [address]  [phone]            Discharge Medication List as of 8/10/2022 10:03 PM      CONTINUE these medications which have NOT CHANGED    Details   diphenhydrAMINE (BENADRYL) 25 mg tablet Take 1 tablet by mouth every 6 (six) hours as needed for itching, Starting Sat 8/5/2017, Print      predniSONE 10 mg tablet Take 5 tabs on day 1 and 2, take 4 tabs on day 3 and 4, take 3 tabs on day 5 and 6, take 2 tabs on day 7 and 8, take 1 tab on day 9 and 10, Print             No discharge procedures on file      PDMP Review     None          ED Provider  Electronically Signed by           Sonja Kothari MD  08/30/22 2815

## 2022-08-11 LAB
ATRIAL RATE: 71 BPM
P AXIS: 39 DEGREES
PR INTERVAL: 160 MS
QRS AXIS: -67 DEGREES
QRSD INTERVAL: 116 MS
QT INTERVAL: 412 MS
QTC INTERVAL: 447 MS
T WAVE AXIS: 47 DEGREES
VENTRICULAR RATE: 71 BPM

## 2022-08-11 PROCEDURE — 93010 ELECTROCARDIOGRAM REPORT: CPT | Performed by: INTERNAL MEDICINE

## 2022-08-11 NOTE — ED NOTES
Patient ambualted to the restroom and back to his assigned room independently and with a steady gait       Stefano Alvarado RN  08/10/22 5905

## 2022-08-11 NOTE — ED NOTES
Patient for discharge  No acute distress noted  All questions answered and DC papers given  IV removed, patent  Ambulatory out of department without difficulty              Parish Koenig RN  08/10/22 [id number]

## 2022-10-26 ENCOUNTER — HOSPITAL ENCOUNTER (EMERGENCY)
Facility: HOSPITAL | Age: 47
Discharge: HOME | End: 2022-10-26
Attending: EMERGENCY MEDICINE
Payer: COMMERCIAL

## 2022-10-26 ENCOUNTER — APPOINTMENT (EMERGENCY)
Dept: CT IMAGING | Facility: HOSPITAL | Age: 47
End: 2022-10-26
Payer: COMMERCIAL

## 2022-10-26 VITALS
OXYGEN SATURATION: 98 % | SYSTOLIC BLOOD PRESSURE: 131 MMHG | RESPIRATION RATE: 16 BRPM | DIASTOLIC BLOOD PRESSURE: 80 MMHG | HEART RATE: 80 BPM | TEMPERATURE: 99 F

## 2022-10-26 DIAGNOSIS — G44.86 CERVICOGENIC HEADACHE: Primary | ICD-10-CM

## 2022-10-26 LAB
ALBUMIN SERPL BCP-MCNC: 4 G/DL (ref 3.5–5)
ALP SERPL-CCNC: 62 U/L (ref 46–116)
ALT SERPL W P-5'-P-CCNC: 18 U/L (ref 12–78)
ANION GAP SERPL CALCULATED.3IONS-SCNC: 7 MMOL/L (ref 4–13)
AST SERPL W P-5'-P-CCNC: 17 U/L (ref 5–45)
BASOPHILS # BLD AUTO: 0.09 THOUSANDS/ÂΜL (ref 0–0.1)
BASOPHILS NFR BLD AUTO: 1 % (ref 0–1)
BILIRUB SERPL-MCNC: 1.08 MG/DL (ref 0.2–1)
BUN SERPL-MCNC: 13 MG/DL (ref 5–25)
CALCIUM SERPL-MCNC: 9 MG/DL (ref 8.3–10.1)
CHLORIDE SERPL-SCNC: 102 MMOL/L (ref 96–108)
CO2 SERPL-SCNC: 30 MMOL/L (ref 21–32)
CREAT SERPL-MCNC: 1.1 MG/DL (ref 0.6–1.3)
CRP SERPL QL: 19.6 MG/L
EOSINOPHIL # BLD AUTO: 0.4 THOUSAND/ÂΜL (ref 0–0.61)
EOSINOPHIL NFR BLD AUTO: 3 % (ref 0–6)
ERYTHROCYTE [DISTWIDTH] IN BLOOD BY AUTOMATED COUNT: 13.1 % (ref 11.6–15.1)
ERYTHROCYTE [SEDIMENTATION RATE] IN BLOOD: 7 MM/HOUR (ref 0–14)
FLUAV RNA RESP QL NAA+PROBE: NEGATIVE
FLUBV RNA RESP QL NAA+PROBE: NEGATIVE
GFR SERPL CREATININE-BSD FRML MDRD: 81 ML/MIN/1.73SQ M
GLUCOSE SERPL-MCNC: 95 MG/DL (ref 65–140)
HCT VFR BLD AUTO: 45 % (ref 36.5–49.3)
HGB BLD-MCNC: 15.6 G/DL (ref 12–17)
IMM GRANULOCYTES # BLD AUTO: 0.06 THOUSAND/UL (ref 0–0.2)
IMM GRANULOCYTES NFR BLD AUTO: 0 % (ref 0–2)
LYMPHOCYTES # BLD AUTO: 4.14 THOUSANDS/ÂΜL (ref 0.6–4.47)
LYMPHOCYTES NFR BLD AUTO: 28 % (ref 14–44)
MCH RBC QN AUTO: 31.5 PG (ref 26.8–34.3)
MCHC RBC AUTO-ENTMCNC: 34.7 G/DL (ref 31.4–37.4)
MCV RBC AUTO: 91 FL (ref 82–98)
MONOCYTES # BLD AUTO: 1.04 THOUSAND/ÂΜL (ref 0.17–1.22)
MONOCYTES NFR BLD AUTO: 7 % (ref 4–12)
NEUTROPHILS # BLD AUTO: 8.89 THOUSANDS/ÂΜL (ref 1.85–7.62)
NEUTS SEG NFR BLD AUTO: 61 % (ref 43–75)
NRBC BLD AUTO-RTO: 0 /100 WBCS
PLATELET # BLD AUTO: 263 THOUSANDS/UL (ref 149–390)
PMV BLD AUTO: 8.9 FL (ref 8.9–12.7)
POTASSIUM SERPL-SCNC: 3.9 MMOL/L (ref 3.5–5.3)
PROT SERPL-MCNC: 7.7 G/DL (ref 6.4–8.4)
RBC # BLD AUTO: 4.95 MILLION/UL (ref 3.88–5.62)
RSV RNA RESP QL NAA+PROBE: NEGATIVE
SARS-COV-2 RNA RESP QL NAA+PROBE: NEGATIVE
SODIUM SERPL-SCNC: 139 MMOL/L (ref 135–147)
WBC # BLD AUTO: 14.62 THOUSAND/UL (ref 4.31–10.16)

## 2022-10-26 PROCEDURE — G1004 CDSM NDSC: HCPCS

## 2022-10-26 PROCEDURE — 85025 COMPLETE CBC W/AUTO DIFF WBC: CPT | Performed by: PHYSICIAN ASSISTANT

## 2022-10-26 PROCEDURE — 85652 RBC SED RATE AUTOMATED: CPT | Performed by: PHYSICIAN ASSISTANT

## 2022-10-26 PROCEDURE — 36415 COLL VENOUS BLD VENIPUNCTURE: CPT | Performed by: PHYSICIAN ASSISTANT

## 2022-10-26 PROCEDURE — 86140 C-REACTIVE PROTEIN: CPT | Performed by: PHYSICIAN ASSISTANT

## 2022-10-26 PROCEDURE — 80053 COMPREHEN METABOLIC PANEL: CPT | Performed by: PHYSICIAN ASSISTANT

## 2022-10-26 PROCEDURE — 93005 ELECTROCARDIOGRAM TRACING: CPT

## 2022-10-26 PROCEDURE — 0241U HB NFCT DS VIR RESP RNA 4 TRGT: CPT | Performed by: PHYSICIAN ASSISTANT

## 2022-10-26 PROCEDURE — 99284 EMERGENCY DEPT VISIT MOD MDM: CPT | Performed by: PHYSICIAN ASSISTANT

## 2022-10-26 PROCEDURE — 70450 CT HEAD/BRAIN W/O DYE: CPT

## 2022-10-26 PROCEDURE — 64405 NJX AA&/STRD GR OCPL NRV: CPT | Performed by: PHYSICIAN ASSISTANT

## 2022-10-26 RX ORDER — METOCLOPRAMIDE HYDROCHLORIDE 5 MG/ML
10 INJECTION INTRAMUSCULAR; INTRAVENOUS ONCE
Status: COMPLETED | OUTPATIENT
Start: 2022-10-26 | End: 2022-10-26

## 2022-10-26 RX ORDER — DIPHENHYDRAMINE HYDROCHLORIDE 50 MG/ML
50 INJECTION INTRAMUSCULAR; INTRAVENOUS ONCE
Status: COMPLETED | OUTPATIENT
Start: 2022-10-26 | End: 2022-10-26

## 2022-10-26 RX ORDER — KETOROLAC TROMETHAMINE 30 MG/ML
15 INJECTION, SOLUTION INTRAMUSCULAR; INTRAVENOUS ONCE
Status: COMPLETED | OUTPATIENT
Start: 2022-10-26 | End: 2022-10-26

## 2022-10-26 RX ORDER — BUPIVACAINE HYDROCHLORIDE 5 MG/ML
10 INJECTION, SOLUTION EPIDURAL; INTRACAUDAL ONCE
Status: COMPLETED | OUTPATIENT
Start: 2022-10-26 | End: 2022-10-26

## 2022-10-26 RX ADMIN — KETOROLAC TROMETHAMINE 15 MG: 30 INJECTION, SOLUTION INTRAMUSCULAR at 21:38

## 2022-10-26 RX ADMIN — DIPHENHYDRAMINE HYDROCHLORIDE 50 MG: 50 INJECTION, SOLUTION INTRAMUSCULAR; INTRAVENOUS at 21:39

## 2022-10-26 RX ADMIN — METOCLOPRAMIDE 10 MG: 5 INJECTION, SOLUTION INTRAMUSCULAR; INTRAVENOUS at 21:39

## 2022-10-26 RX ADMIN — BUPIVACAINE HYDROCHLORIDE 10 ML: 5 INJECTION, SOLUTION EPIDURAL; INTRACAUDAL; PERINEURAL at 22:41

## 2022-10-27 LAB
ATRIAL RATE: 84 BPM
P AXIS: 64 DEGREES
PR INTERVAL: 158 MS
QRS AXIS: 229 DEGREES
QRSD INTERVAL: 112 MS
QT INTERVAL: 366 MS
QTC INTERVAL: 432 MS
T WAVE AXIS: 54 DEGREES
VENTRICULAR RATE: 84 BPM

## 2022-10-27 PROCEDURE — 93010 ELECTROCARDIOGRAM REPORT: CPT | Performed by: INTERNAL MEDICINE

## 2022-10-27 NOTE — ED PROVIDER NOTES
History  Chief Complaint   Patient presents with   • Headache     C/o head "pressure" x 1 month and drainage from L ear intermittant x 1 month  No known injury  Pt states he has also has bodyaches and generalized weakness     Patient is a 29-year-old male with no significant past medical history presenting to the emergency department for evaluation of left-sided neck pain and headache that has been intermittent for the past several months  Pain is “10/10” in severity  Comes and goes  Starts at the top of his neck and radiates up the left side of his head  He states that his scalp and neck are very sensitive to palpation  He is also reporting a feeling of pressure in his head  He feels like his head is going to [employer]  Unrelieved by Tylenol  He also reports transient episode of blurred vision in the left eye this morning, lasting approximately 5 minutes  Vision now back to baseline  He states that he occasionally “loses control of his arms”  When asked to elaborate, patient states that he feels like he can not control the movement of his bilateral upper extremities  He is reporting diffuse myalgias, nasal congestion, sinus pressure  Seen here approximately 1 month ago for similar complaint, negative workup at that time  Patient denying fevers, chills, chest pain, difficulty breathing, abdominal pain, nausea, vomiting, diarrhea  No other complaints at this time        History provided by:  Patient   used: No    Headache  Pain location:  L parietal, occipital and L temporal  Quality:  Sharp  Radiates to:  L neck  Severity currently:  10/10  Severity at highest:  10/10  Onset quality:  Gradual  Duration:  12 weeks  Timing:  Intermittent  Progression:  Waxing and waning  Chronicity:  New  Similar to prior headaches: no    Context: bright light    Relieved by:  Nothing  Worsened by:  Light  Ineffective treatments:  Acetaminophen  Associated symptoms: blurred vision, congestion, drainage, fatigue, myalgias, neck pain, photophobia, sinus pressure and visual change    Associated symptoms: no abdominal pain, no back pain, no cough, no diarrhea, no dizziness, no ear pain, no eye pain, no facial pain, no fever, no focal weakness, no hearing loss, no nausea, no near-syncope, no neck stiffness, no numbness, no paresthesias, no sore throat, no swollen glands, no syncope, no tingling, no vomiting and no weakness        Prior to Admission Medications   Prescriptions Last Dose Informant Patient Reported? Taking? diphenhydrAMINE (BENADRYL) 25 mg tablet   No No   Sig: Take 1 tablet by mouth every 6 (six) hours as needed for itching   Patient not taking: Reported on 9/20/2018    predniSONE 10 mg tablet   No No   Sig: Take 5 tabs on day 1 and 2, take 4 tabs on day 3 and 4, take 3 tabs on day 5 and 6, take 2 tabs on day 7 and 8, take 1 tab on day 9 and 10   Patient not taking: Reported on 9/20/2018       Facility-Administered Medications: None       Past Medical History:   Diagnosis Date   • DVT (deep venous thrombosis) ([facility] [facility] )        History reviewed  No pertinent surgical history  Family History   Problem Relation Age of Onset   • No Known Problems Mother    • No Known Problems Father      I have reviewed and agree with the history as documented  E-Cigarette/Vaping     E-Cigarette/Vaping Substances     Social History     Tobacco Use   • Smoking status: Current Some Day Smoker   • Smokeless tobacco: Former User     Quit date: 8/5/2016   Substance Use Topics   • Alcohol use: No   • Drug use: No       Review of Systems   Constitutional: Positive for fatigue  Negative for chills and fever  HENT: Positive for congestion, postnasal drip and sinus pressure  Negative for ear pain, hearing loss and sore throat  Eyes: Positive for blurred vision, photophobia and visual disturbance  Negative for pain  Respiratory: Negative for cough  Cardiovascular: Negative for syncope and near-syncope  Gastrointestinal: Negative for abdominal pain, diarrhea, nausea and vomiting  Musculoskeletal: Positive for myalgias and neck pain  Negative for back pain and neck stiffness  Neurological: Positive for headaches  Negative for dizziness, focal weakness, weakness, numbness and paresthesias  All other systems reviewed and are negative  Physical Exam  Physical Exam  Vitals reviewed  Constitutional:       General: He is not in acute distress  Appearance: Normal appearance  He is normal weight  He is not ill-appearing, toxic-appearing or diaphoretic  HENT:      Head: Normocephalic and atraumatic  Comments: Left side of head hypersensitive to palpation in the left occipital nerve distribution  Right Ear: Tympanic membrane, ear canal and external ear normal  There is no impacted cerumen  Left Ear: Tympanic membrane, ear canal and external ear normal  There is no impacted cerumen  Mouth/Throat:      Mouth: Mucous membranes are moist       Pharynx: Oropharynx is clear  No oropharyngeal exudate or posterior oropharyngeal erythema  Eyes:      General: No visual field deficit or scleral icterus  Right eye: No discharge  Left eye: No discharge  Extraocular Movements: Extraocular movements intact  Conjunctiva/sclera: Conjunctivae normal    Cardiovascular:      Rate and Rhythm: Normal rate and regular rhythm  Pulses: Normal pulses  Heart sounds: Normal heart sounds  No murmur heard  No friction rub  No gallop  Pulmonary:      Effort: Pulmonary effort is normal  No respiratory distress  Breath sounds: Normal breath sounds  No stridor  No wheezing, rhonchi or rales  Musculoskeletal:         General: Normal range of motion  Cervical back: Normal range of motion and neck supple  Muscular tenderness (Left cervical paraspinal muscles) present  Right lower leg: No edema  Left lower leg: No edema     Skin:     General: Skin is warm and dry       Capillary Refill: Capillary refill takes less than 2 seconds  Neurological:      General: No focal deficit present  Mental Status: He is alert and oriented to person, place, and time  GCS: GCS eye subscore is 4  GCS verbal subscore is 5  GCS motor subscore is 6  Cranial Nerves: Cranial nerves are intact  No cranial nerve deficit, dysarthria or facial asymmetry  Sensory: Sensation is intact  No sensory deficit  Motor: Motor function is intact  No weakness, tremor, seizure activity or pronator drift  Coordination: Coordination is intact   Coordination normal  Finger-Nose-Finger Test and Heel to [facility] Test normal  Rapid alternating movements normal    Psychiatric:         Mood and Affect: Mood normal          Behavior: Behavior normal          Vital Signs  ED Triage Vitals [10/26/22 1909]   Temperature Pulse Respirations Blood Pressure SpO2   99 2 °F (37 3 °C) 77 17 145/89 99 %      Temp Source Heart Rate Source Patient Position - Orthostatic VS BP Location FiO2 (%)   Oral Monitor -- Left arm --      Pain Score       9           Vitals:    10/26/22 1909 10/26/22 1955 10/26/22 2015   BP: 145/89  141/86   Pulse: 77 80 82         Visual Acuity  Visual Acuity    Flowsheet Row Most Recent Value   L Pupil Size (mm) 5   R Pupil Size (mm) 5          ED Medications  Medications   bupivacaine (PF) (MARCAINE) 0 5 % injection 10 mL (has no administration in time range)   ketorolac (TORADOL) injection 15 mg (15 mg Intravenous Given 10/26/22 2138)   metoclopramide (REGLAN) injection 10 mg (10 mg Intravenous Given 10/26/22 2139)   diphenhydrAMINE (BENADRYL) injection 50 mg (50 mg Intravenous Given 10/26/22 2139)       Diagnostic Studies  Results Reviewed     Procedure Component Value Units Date/Time    C-reactive protein [[id number]]  (Abnormal) Collected: 10/26/22 2128    Lab Status: Final result Specimen: Blood from Arm, Right Updated: 10/26/22 2230     CRP 19 6 mg/L     Comprehensive metabolic panel [[id number]]  (Abnormal) Collected: 10/26/22 2128    Lab Status: Final result Specimen: Blood from Arm, Right Updated: 10/26/22 2156     Sodium 139 mmol/L      Potassium 3 9 mmol/L      Chloride 102 mmol/L      CO2 30 mmol/L      ANION GAP 7 mmol/L      BUN 13 mg/dL      Creatinine 1 10 mg/dL      Glucose 95 mg/dL      Calcium 9 0 mg/dL      AST 17 U/L      ALT 18 U/L      Alkaline Phosphatase 62 U/L      Total Protein 7 7 g/dL      Albumin 4 0 g/dL      Total Bilirubin 1 08 mg/dL      eGFR 81 ml/min/1 73sq m     Narrative:      [place] guidelines for Chronic Kidney Disease (CKD):   •  Stage 1 with normal or high GFR (GFR > 90 mL/min/1 73 square meters)  •  Stage 2 Mild CKD (GFR = 60-89 mL/min/1 73 square meters)  •  Stage 3A Moderate CKD (GFR = 45-59 mL/min/1 73 square meters)  •  Stage 3B Moderate CKD (GFR = 30-44 mL/min/1 73 square meters)  •  Stage 4 Severe CKD (GFR = 15-29 mL/min/1 73 square meters)  •  Stage 5 End Stage CKD (GFR <15 mL/min/1 73 square meters)  Note: GFR calculation is accurate only with a steady state creatinine    Sedimentation rate, automated [[id number]]  (Normal) Collected: 10/26/22 2128    Lab Status: Final result Specimen: Blood from Arm, Right Updated: 10/26/22 2152     Sed Rate 7 mm/hour     CBC and differential [[id number]]  (Abnormal) Collected: 10/26/22 2128    Lab Status: Final result Specimen: Blood from Arm, Right Updated: 10/26/22 2139     WBC 14 62 Thousand/uL      RBC 4 95 Million/uL      Hemoglobin 15 6 g/dL      Hematocrit 45 0 %      MCV 91 fL      MCH 31 5 pg      MCHC 34 7 g/dL      RDW 13 1 %      MPV 8 9 fL      Platelets 881 Thousands/uL      nRBC 0 /100 WBCs      Neutrophils Relative 61 %      Immat GRANS % 0 %      Lymphocytes Relative 28 %      Monocytes Relative 7 %      Eosinophils Relative 3 %      Basophils Relative 1 %      Neutrophils Absolute 8 89 Thousands/µL      Immature Grans Absolute 0 06 Thousand/uL Lymphocytes Absolute 4 14 Thousands/µL      Monocytes Absolute 1 04 Thousand/µL      Eosinophils Absolute 0 40 Thousand/µL      Basophils Absolute 0 09 Thousands/µL     FLU/RSV/COVID - if FLU/RSV clinically relevant [[id number]]  (Normal) Collected: 10/26/22 2012    Lab Status: Final result Specimen: Nares from Nose Updated: 10/26/22 2102     SARS-CoV-2 Negative     INFLUENZA A PCR Negative     INFLUENZA B PCR Negative     RSV PCR Negative    Narrative:      FOR PEDIATRIC PATIENTS - copy/paste COVID Guidelines URL to browser: [email or web address]  [employer]    SARS-CoV-2 assay is a Nucleic Acid Amplification assay intended for the  qualitative detection of nucleic acid from SARS-CoV-2 in nasopharyngeal  swabs  Results are for the presumptive identification of SARS-CoV-2 RNA  Positive results are indicative of infection with SARS-CoV-2, the virus  causing COVID-19, but do not rule out bacterial infection or co-infection  with other viruses  Laboratories within the [place] and its  territories are required to report all positive results to the appropriate  public health authorities  Negative results do not preclude SARS-CoV-2  infection and should not be used as the sole basis for treatment or other  patient management decisions  Negative results must be combined with  clinical observations, patient history, and epidemiological information  This test has not been FDA cleared or approved  This test has been authorized by FDA under an Emergency Use Authorization  (EUA)  This test is only authorized for the duration of time the  declaration that circumstances exist justifying the authorization of the  emergency use of an in vitro diagnostic tests for detection of SARS-CoV-2  virus and/or diagnosis of COVID-19 infection under section 564(b)(1) of  the Act, 21 U  S C  756CQH-6(H)(7), unless the authorization is terminated  or revoked sooner   The test has been validated but independent review by FDA  and CLIA is pending  Test performed using [employer] Gene[employer]pert: This RT-PCR assay targets N2,  a region unique to SARS-CoV-2  A conserved region in the E-gene was chosen  for pan-Sarbecovirus detection which includes SARS-CoV-2  According to CMS-2020-01-R, this platform meets the definition of high-throughput technology  CT head without contrast   Final Result by Mau Robledo MD (10/26 2127)      No acute intracranial abnormality  Chronic sinus disease as above  Workstation performed: [id number]                    Procedures  Nerve block    Date/Time: 10/26/2022 10:44 PM  Performed by: Brandan Messer PA-C  Authorized by: Brandan Messer PA-C   Universal Protocol:  Consent: Verbal consent obtained  Risks and benefits: risks, benefits and alternatives were discussed  Consent given by: patient  Patient understanding: patient states understanding of the procedure being performed  Patient consent: the patient's understanding of the procedure matches consent given  Procedure consent: procedure consent matches procedure scheduled  Patient identity confirmed: verbally with patient, arm band and hospital-assigned identification number      Indications:     Indications:  Pain relief  Location:     Body area:  Head    Head nerve:  Greater occipital and lesser occipital    Nerve type:  Peripheral    Laterality:  Left  Pre-procedure details:     Skin preparation:  Alcohol    Preparation: Patient was prepped and draped in usual sterile fashion    Procedure details (see MAR for exact dosages):      Block needle gauge:  27 G    Anesthetic injected:  Bupivacaine 0 5% w/o epi    Steroid injected:  None    Additive injected:  None    Injection procedure:  Anatomic landmarks identified, anatomic landmarks palpated, incremental injection, introduced needle and negative aspiration for blood  Post-procedure details:     Dressing:  None    Outcome:  Pain relieved Patient tolerance of procedure: Tolerated well, no immediate complications             ED Course                               SBIRT 20yo+    Flowsheet Row Most Recent Value   SBIRT (23 yo +)    In order to provide better care to our patients, we are screening all of our patients for alcohol and drug use  Would it be okay to ask you these screening questions? Yes Filed at: 10/26/2022 2013   Initial Alcohol Screen: US AUDIT-C     1  How often do you have a drink containing alcohol? 1 Filed at: 10/26/2022 2013   2  How many drinks containing alcohol do you have on a typical day you are drinking? 1 Filed at: 10/26/2022 2013   3a  Male UNDER 65: How often do you have five or more drinks on one occasion? 0 Filed at: 10/26/2022 2013   Audit-C Score 2 Filed at: 10/26/2022 2013   FERNANDO: How many times in the past year have you    Used an illegal drug or used a prescription medication for non-medical reasons? Never Filed at: 10/26/2022 2013                    MDM  Number of Diagnoses or Management Options  Diagnosis management comments: Patient presenting for evaluation of left-sided neck pain, left-sided headache, hypersensitivity to the left side of scalp  Upon arrival, he appears comfortable  He is not any acute distress  Vital signs unremarkable  No neurologic deficits on exam   CT head unremarkable  Lab work reassuring  Patient felt significant improvement after occipital nerve block and migraine cocktail  High suspicion for occipital neuralgia  Will provide Neurology follow-up, discharge patient home in stable condition  Strict return precautions were discussed         Amount and/or Complexity of Data Reviewed  Clinical lab tests: reviewed and ordered  Tests in the radiology section of CPT®: ordered and reviewed    Risk of Complications, Morbidity, and/or Mortality  Presenting problems: low  Diagnostic procedures: low  Management options: low    Patient Progress  Patient progress: stable      Disposition  Final diagnoses:   Cervicogenic headache     Time reflects when diagnosis was documented in both MDM as applicable and the Disposition within this note     Time User Action Codes Description Comment    10/26/2022 10:47 PM Tisha Shirin Add [G44 86] Cervicogenic headache       ED Disposition     ED Disposition   Discharge    Condition   Stable    Date/Time   Wed Oct 26, 2022 10:47 PM    Comment   Walter Oz discharge to home/self care  Follow-up Information     Follow up With Specialties Details Why Contact Info Additional [address] [facility] Emergency Department Emergency Medicine Go to  If symptoms worsen [address] [facility] [address] [facility] Emergency Department, [address] [place] [address], [address]  [facility] Neurology Schedule an appointment as soon as possible for a visit  For follow up [address] [facility] [phone]  [address] Neu[address] [facility], [address], [place], [place], [address] [facility],[address]          Patient's Medications   Discharge Prescriptions    No medications on file       No discharge procedures on file      PDMP Review     None          ED Provider  Electronically Signed by           Sophie Handley PA-C  10/26/22 2864

## 2022-10-27 NOTE — DISCHARGE INSTRUCTIONS
I have high suspicion for occipital neuralgia given relief of pain with occipital nerve block  Please follow up with neurology at your earliest convenience for continued evaluation and management

## 2023-03-19 ENCOUNTER — OFFICE VISIT (OUTPATIENT)
Dept: URGENT CARE | Facility: CLINIC | Age: 48
End: 2023-03-19

## 2023-03-19 VITALS
RESPIRATION RATE: 18 BRPM | TEMPERATURE: 97.9 F | DIASTOLIC BLOOD PRESSURE: 83 MMHG | SYSTOLIC BLOOD PRESSURE: 141 MMHG | OXYGEN SATURATION: 99 % | HEART RATE: 83 BPM

## 2023-03-19 DIAGNOSIS — J01.00 ACUTE NON-RECURRENT MAXILLARY SINUSITIS: Primary | ICD-10-CM

## 2023-03-19 RX ORDER — AMOXICILLIN AND CLAVULANATE POTASSIUM 875; 125 MG/1; MG/1
1 TABLET, FILM COATED ORAL EVERY 12 HOURS SCHEDULED
Qty: 14 TABLET | Refills: 0 | Status: SHIPPED | OUTPATIENT
Start: 2023-03-19 | End: 2023-03-26

## 2023-03-19 NOTE — PROGRESS NOTES
[address] [employer] [employer] [facility]        NAME: Loki Rossi is a 41 y o  male  : 1975    MRN: [id number]  DATE: 2023  TIME: 12:55 PM    Assessment and Plan   Acute non-recurrent maxillary sinusitis [J01 00]  1  Acute non-recurrent maxillary sinusitis  Ambulatory Referral to Otolaryngology    amoxicillin-clavulanate (AUGMENTIN) 875-125 mg per tablet            Patient Instructions   Patient Instructions   Follow-up with your primary care provider or ENT in the next 3-5 days  Any new or worsening symptoms develop get re-evaluated sooner or proceed to the ER  Follow up with PCP in 3-5 days  Proceed to  ER if symptoms worsen  Chief Complaint     Chief Complaint   Patient presents with   • Sinusitis     States left sided sinus pressure  Started on Thursday  States he feels like some thing is stuck in his left nostril  States he feels pressure to left eye   History of Present Illness       Patient presents with left sided sinus pressure  States feels pressure in the eye  Started 4 days ago  States he was eating a taco the other day too and got a piece stuck in the gums  Also with a cough, and congestion  States he had a previous injury from a dental procedure where tooth went into the sinus cavity  States he has recurrent sinus infections  Denies right sided sinus pressure, dental pain  Review of Systems   Review of Systems   Constitutional: Negative for chills, fatigue and fever  HENT: Positive for congestion, rhinorrhea, sinus pressure and sinus pain  Negative for ear discharge, ear pain, mouth sores, postnasal drip, sore throat and trouble swallowing  Respiratory: Positive for cough  Negative for chest tightness, shortness of breath and wheezing  Cardiovascular: Negative for chest pain and palpitations  Musculoskeletal: Negative for arthralgias and myalgias  Neurological: Negative for weakness  Psychiatric/Behavioral: Negative for confusion           Current Medications       Current Outpatient Medications:   •  amoxicillin-clavulanate (AUGMENTIN) 875-125 mg per tablet, Take 1 tablet by mouth every 12 (twelve) hours for 7 days, Disp: 14 tablet, Rfl: 0  •  diphenhydrAMINE (BENADRYL) 25 mg tablet, Take 1 tablet by mouth every 6 (six) hours as needed for itching (Patient not taking: Reported on 9/20/2018 ), Disp: 30 tablet, Rfl: 0  •  predniSONE 10 mg tablet, Take 5 tabs on day 1 and 2, take 4 tabs on day 3 and 4, take 3 tabs on day 5 and 6, take 2 tabs on day 7 and 8, take 1 tab on day 9 and 10 (Patient not taking: Reported on 9/20/2018 ), Disp: 30 tablet, Rfl: 0    Current Allergies     Allergies as of 03/19/2023 - Reviewed 03/19/2023   Allergen Reaction Noted   • Amitriptyline     • Motrin [ibuprofen] Fever 04/13/2017   • Valproic acid              The following portions of the patient's history were reviewed and updated as appropriate: allergies, current medications, past family history, past medical history, past social history, past surgical history and problem list      Past Medical History:   Diagnosis Date   • DVT (deep venous thrombosis) ([facility] [address] )        History reviewed  No pertinent surgical history  Family History   Problem Relation Age of Onset   • No Known Problems Mother    • No Known Problems Father          Medications have been verified  Objective   /83 (BP Location: Right arm, Patient Position: Sitting, Cuff Size: Standard)   Pulse 83   Temp 97 9 °F (36 6 °C) (Temporal)   Resp 18   SpO2 99%        Physical Exam     Physical Exam  Constitutional:       General: He is not in acute distress  Appearance: Normal appearance  He is not ill-appearing or diaphoretic  HENT:      Head: Normocephalic and atraumatic  Right Ear: Tympanic membrane, ear canal and external ear normal       Left Ear: Tympanic membrane, ear canal and external ear normal       Nose:      Right Sinus: No maxillary sinus tenderness or frontal sinus tenderness  Left Sinus: Maxillary sinus tenderness present  Mouth/Throat:      Mouth: Mucous membranes are moist       Pharynx: Oropharynx is clear  Eyes:      Conjunctiva/sclera: Conjunctivae normal    Cardiovascular:      Rate and Rhythm: Normal rate and regular rhythm  Heart sounds: Normal heart sounds  Pulmonary:      Effort: Pulmonary effort is normal       Breath sounds: Normal breath sounds  Musculoskeletal:      Cervical back: No muscular tenderness  Skin:     General: Skin is warm and dry  Neurological:      Mental Status: He is alert     Psychiatric:         Mood and Affect: Mood normal          Behavior: Behavior normal

## 2023-03-19 NOTE — PATIENT INSTRUCTIONS
Follow-up with your primary care provider or ENT in the next 3-5 days  Any new or worsening symptoms develop get re-evaluated sooner or proceed to the ER

## 2023-03-21 ENCOUNTER — TELEPHONE (OUTPATIENT)
Dept: OTHER | Facility: OTHER | Age: 48
End: 2023-03-21

## 2023-03-21 ENCOUNTER — HOSPITAL ENCOUNTER (EMERGENCY)
Facility: HOSPITAL | Age: 48
Discharge: HOME | End: 2023-03-21
Attending: EMERGENCY MEDICINE

## 2023-03-21 VITALS
SYSTOLIC BLOOD PRESSURE: 116 MMHG | HEART RATE: 82 BPM | DIASTOLIC BLOOD PRESSURE: 84 MMHG | OXYGEN SATURATION: 96 % | TEMPERATURE: 97.8 F | WEIGHT: 175 LBS | RESPIRATION RATE: 16 BRPM | HEIGHT: 67 IN | BODY MASS INDEX: 27.47 KG/M2

## 2023-03-21 DIAGNOSIS — J32.9 SINUSITIS: Primary | ICD-10-CM

## 2023-03-21 DIAGNOSIS — K64.5 EXTERNAL HEMORRHOID, THROMBOSED: ICD-10-CM

## 2023-03-21 RX ORDER — IPRATROPIUM BROMIDE AND ALBUTEROL SULFATE 2.5; .5 MG/3ML; MG/3ML
3 SOLUTION RESPIRATORY (INHALATION) ONCE
Status: COMPLETED | OUTPATIENT
Start: 2023-03-21 | End: 2023-03-21

## 2023-03-21 RX ORDER — DIPHENHYDRAMINE HCL 25 MG
25 TABLET ORAL ONCE
Status: COMPLETED | OUTPATIENT
Start: 2023-03-21 | End: 2023-03-21

## 2023-03-21 RX ORDER — FLUTICASONE PROPIONATE 50 MCG
1 SPRAY, SUSPENSION (ML) NASAL DAILY
Qty: 16 G | Refills: 0 | Status: SHIPPED | OUTPATIENT
Start: 2023-03-21

## 2023-03-21 RX ORDER — LORATADINE 10 MG/1
10 TABLET ORAL DAILY
Status: DISCONTINUED | OUTPATIENT
Start: 2023-03-21 | End: 2023-03-21 | Stop reason: HOSPADM

## 2023-03-21 RX ORDER — FLUTICASONE PROPIONATE 50 MCG
1 SPRAY, SUSPENSION (ML) NASAL DAILY
Status: DISCONTINUED | OUTPATIENT
Start: 2023-03-21 | End: 2023-03-21 | Stop reason: HOSPADM

## 2023-03-21 RX ORDER — ALBUTEROL SULFATE 90 UG/1
2 AEROSOL, METERED RESPIRATORY (INHALATION) ONCE
Status: COMPLETED | OUTPATIENT
Start: 2023-03-21 | End: 2023-03-21

## 2023-03-21 RX ORDER — HYDROCORTISONE 10 MG/G
CREAM TOPICAL
Qty: 28 G | Refills: 0 | Status: SHIPPED | OUTPATIENT
Start: 2023-03-21

## 2023-03-21 RX ORDER — KETOROLAC TROMETHAMINE 30 MG/ML
30 INJECTION, SOLUTION INTRAMUSCULAR; INTRAVENOUS ONCE
Status: COMPLETED | OUTPATIENT
Start: 2023-03-21 | End: 2023-03-21

## 2023-03-21 RX ORDER — HYDROCORTISONE 25 MG/G
CREAM TOPICAL ONCE
Status: COMPLETED | OUTPATIENT
Start: 2023-03-21 | End: 2023-03-21

## 2023-03-21 RX ADMIN — HYDROCORTISONE: 25 CREAM TOPICAL at 15:13

## 2023-03-21 RX ADMIN — KETOROLAC TROMETHAMINE 30 MG: 30 INJECTION, SOLUTION INTRAMUSCULAR at 13:36

## 2023-03-21 RX ADMIN — FLUTICASONE PROPIONATE 1 SPRAY: 50 SPRAY, METERED NASAL at 13:37

## 2023-03-21 RX ADMIN — IPRATROPIUM BROMIDE AND ALBUTEROL SULFATE 3 ML: 2.5; .5 SOLUTION RESPIRATORY (INHALATION) at 13:36

## 2023-03-21 RX ADMIN — LORATADINE 10 MG: 10 TABLET ORAL at 14:39

## 2023-03-21 RX ADMIN — ALBUTEROL SULFATE 2 PUFF: 90 AEROSOL, METERED RESPIRATORY (INHALATION) at 13:36

## 2023-03-21 RX ADMIN — DIPHENHYDRAMINE HCL 25 MG: 25 TABLET ORAL at 14:39

## 2023-03-21 NOTE — ED NOTES
Pt stated he feels "hot and red"  No itching present  Provider aware        Genna Parker, ROSETTA  03/21/23 9025

## 2023-03-21 NOTE — Clinical Note
Ute Jmaes was seen and treated in our emergency department on 3/21/2023  Diagnosis:     Shadi Ram  may return to work on return date  He may return on this date: 03/22/2023         If you have any questions or concerns, please don't hesitate to call        Lynn Fernandez MD    ______________________________           _______________          _______________  Hospital Representative                              Date                                Time

## 2023-03-21 NOTE — TELEPHONE ENCOUNTER
Call from patient requesting appointment and to discuss switching his PCP care to your office  Patient had difficulty getting in with another practice an would like to make sure insurance is also in network  Please call patient

## 2023-03-30 PROBLEM — Z76.89 ENCOUNTER TO ESTABLISH CARE: Status: ACTIVE | Noted: 2023-03-30

## 2023-03-30 NOTE — ED PROVIDER NOTES
History  Chief Complaint   Patient presents with   • Hemorrhoids   • Sinus Problem     Pressure       Patient is a 82-year-old male who presents who presents for evaluation of multiple complaints  Primarily, the patient presents for evaluation of sinus pressure and mild pain  He recently was started on Augmentin for this at urgent care  However he still has some significant frontal sinus pressure  He does endorse some rhinorrhea and congestion at times with white/yellowish sputum expressed  He denies fever chills rigors cough  Other than the antibiotic, he has not been take anything for his congestion this seems to be a chronic issue that been going on for months  Secondary complaint of weeks of rectal pain  History of hemorrhoids in the past, feels similar to prior  He took some over-the-counter hemorrhoid cream which has helped his symptoms slightly  No blood noted in the stool recently  Also complaining of some ongoing wheezing for months  No significant dyspnea or chest pain  Denies abdominal pain nausea or vomiting associate with the symptoms  He is a current smoker  Prior to Admission Medications   Prescriptions Last Dose Informant Patient Reported? Taking?   amoxicillin-clavulanate (AUGMENTIN) 875-125 mg per tablet   No No   Sig: Take 1 tablet by mouth every 12 (twelve) hours for 7 days   diphenhydrAMINE (BENADRYL) 25 mg tablet   No No   Sig: Take 1 tablet by mouth every 6 (six) hours as needed for itching   Patient not taking: Reported on 9/20/2018    predniSONE 10 mg tablet   No No   Sig: Take 5 tabs on day 1 and 2, take 4 tabs on day 3 and 4, take 3 tabs on day 5 and 6, take 2 tabs on day 7 and 8, take 1 tab on day 9 and 10   Patient not taking: Reported on 9/20/2018       Facility-Administered Medications: None       Past Medical History:   Diagnosis Date   • [facility]        History reviewed  No pertinent surgical history      Family History   Problem Relation Age of Onset   • No Known Problems Mother    • No Known Problems Father      I have reviewed and agree with the history as documented  E-Cigarette/Vaping   • E-Cigarette Use Never User      E-Cigarette/Vaping Substances     Social History     Tobacco Use   • Smoking status: Some Days     Packs/day: 0 50     Types: Cigarettes   • Smokeless tobacco: Former     Quit date: 8/5/2016   Vaping Use   • Vaping Use: Never used   Substance Use Topics   • Alcohol use: No   • Drug use: Yes     Types: Marijuana       Review of Systems   Constitutional: Negative for fever  HENT: Positive for sinus pain  Negative for sore throat  Eyes: Negative for photophobia  Respiratory: Positive for wheezing  Negative for shortness of breath  Cardiovascular: Negative for chest pain  Gastrointestinal: Negative for abdominal pain  Genitourinary: Negative for dysuria  Rectal pain   Musculoskeletal: Negative for back pain  Skin: Negative for rash  Neurological: Negative for light-headedness  Hematological: Negative for adenopathy  Psychiatric/Behavioral: Negative for agitation  All other systems reviewed and are negative  Physical Exam  Physical Exam  Vitals reviewed  Constitutional:       General: He is not in acute distress  Appearance: He is well-developed  HENT:      Head: Normocephalic  Eyes:      Pupils: Pupils are equal, round, and reactive to light  Cardiovascular:      Rate and Rhythm: Normal rate and regular rhythm  Heart sounds: Normal heart sounds  No murmur heard  No friction rub  No gallop  Pulmonary:      Effort: Pulmonary effort is normal       Comments: Faint expiratory wheezing heard throughout, no increased work of breathing  Abdominal:      General: Bowel sounds are normal  There is no distension  Palpations: Abdomen is soft  Tenderness: There is no abdominal tenderness  There is no guarding  Genitourinary:     Comments:  Thrombosed external hemorrhoid noted  Musculoskeletal:         General: Normal range of motion  Cervical back: Normal range of motion and neck supple  Skin:     Capillary Refill: Capillary refill takes less than 2 seconds  Neurological:      Mental Status: He is alert and oriented to person, place, and time  Cranial Nerves: No cranial nerve deficit  Sensory: No sensory deficit  Motor: No abnormal muscle tone  Psychiatric:         Behavior: Behavior normal          Thought Content: Thought content normal          Judgment: Judgment normal          Vital Signs  ED Triage Vitals [03/21/23 1258]   Temperature Pulse Respirations Blood Pressure SpO2   97 8 °F (36 6 °C) 87 16 120/71 95 %      Temp Source Heart Rate Source Patient Position - Orthostatic VS BP Location FiO2 (%)   Tympanic Monitor Sitting Left arm --      Pain Score       10 - Worst Possible Pain           Vitals:    03/21/23 1258 03/21/23 1514   BP: 120/71 116/84   Pulse: 87 82   Patient Position - Orthostatic VS: Sitting Lying         Visual Acuity      ED Medications  Medications   ipratropium-albuterol (DUO-NEB) 0 5-2 5 mg/3 mL inhalation solution 3 mL (3 mL Nebulization Given 3/21/23 1336)   ketorolac (TORADOL) injection 30 mg (30 mg Intramuscular Given 3/21/23 1336)   hydrocortisone (ANUSOL-HC) 2 5 % rectal cream ( Topical Given 3/21/23 1513)   albuterol (PROVENTIL HFA,VENTOLIN HFA) inhaler 2 puff (2 puffs Inhalation Given 3/21/23 1336)   diphenhydrAMINE (BENADRYL) tablet 25 mg (25 mg Oral Given 3/21/23 1439)       Diagnostic Studies  Results Reviewed     None                 No orders to display              Procedures  Procedures         ED Course                               SBIRT 20yo+    Flowsheet Row Most Recent Value   SBIRT (23 yo +)    In order to provide better care to our patients, we are screening all of our patients for alcohol and drug use  Would it be okay to ask you these screening questions?  Yes Filed at: 03/21/2023 5162 Initial Alcohol Screen: US AUDIT-C     1  How often do you have a drink containing alcohol? 1 Filed at: 03/21/2023 1346   2  How many drinks containing alcohol do you have on a typical day you are drinking? 2 Filed at: 03/21/2023 1346   3a  Male UNDER 65: How often do you have five or more drinks on one occasion? 0 Filed at: 03/21/2023 1346   3b  FEMALE Any Age, or MALE 65+: How often do you have 4 or more drinks on one occassion? 0 Filed at: 03/21/2023 1346   Audit-C Score 3 Filed at: 03/21/2023 1346   FERNANDO: How many times in the past year have you    Used an illegal drug or used a prescription medication for non-medical reasons? Never Filed at: 03/21/2023 1346                    Medical Decision Making  Patient is a 82-year-old male without a PCP the presents for multiple complaints  These are somewhat chronic complaints  Patient treated for acute sinusitis by urgent care, will keep on antibiotic at this time  Also prescribed Flonase to help with symptoms  Found to have several weeks of a thrombosed external hemorrhoid, given Anusol cream   He is outside the window for hemorrhoid excision at this time  Patient also complaining of some ongoing wheezing, counseled on smoking cessation and given albuterol puff inhaler  PCP follow-up given and stressed the importance of this  Patient was given a dose of Toradol for his sinus pain and had a mild rash, allergy added to the chart  No evidence of respiratory compromise  Risk  OTC drugs  Prescription drug management            Disposition  Final diagnoses:   Sinusitis   External hemorrhoid, thrombosed     Time reflects when diagnosis was documented in both MDM as applicable and the Disposition within this note     Time User Action Codes Description Comment    3/21/2023  2:00 PM Claude Brighter Add [J32 9] Sinusitis     3/21/2023  2:00 PM Claude Brighter Add [K64 5] External hemorrhoid, thrombosed       ED Disposition     ED Disposition   Discharge Condition   Stable    Date/Time   Tue Mar 21, 2023  2:01 PM    Comment   Maggie Banegas discharge to home/self care                 Follow-up Information     Follow up With Specialties Details Why Contact Info Additional Tj Baird, DO Otolaryngology Schedule an appointment as soon as possible for a visit   [address] [facility]  [address] [facility] Emergency Department Emergency Medicine  If symptoms worsen [address] [facility] [address] [place] [address]  [facility] Emergency Department, [address] [facility], [facility], [address] [facility]    Joseluis James DO Family Medicine Schedule an appointment as soon as possible for a visit   [address] [facility]  Ghada Duarte 75MD Ed Otolaryngology   [address] [place] [address]  [phone]             Discharge Medication List as of 3/21/2023  2:01 PM      START taking these medications    Details   fluticasone (FLONASE) 50 mcg/act nasal spray 1 spray into each nostril daily, Starting Tue 3/21/2023, Normal      Hydrocortisone, Perianal, (Proctocort) 1 % CREA Apply to rectum twice a day as needed for pain associated with hemorrhoids, Normal         CONTINUE these medications which have NOT CHANGED    Details   amoxicillin-clavulanate (AUGMENTIN) 875-125 mg per tablet Take 1 tablet by mouth every 12 (twelve) hours for 7 days, Starting Sun 3/19/2023, Until Sun 3/26/2023, Normal      diphenhydrAMINE (BENADRYL) 25 mg tablet Take 1 tablet by mouth every 6 (six) hours as needed for itching, Starting Sat 8/5/2017, Print      predniSONE 10 mg tablet Take 5 tabs on day 1 and 2, take 4 tabs on day 3 and 4, take 3 tabs on day 5 and 6, take 2 tabs on day 7 and 8, take 1 tab on day 9 and 10, Print                 PDMP Review     None          ED Provider  Electronically Signed by           James Baptiste, MD  03/30/23 2040

## 2023-04-13 PROBLEM — Z12.11 ENCOUNTER FOR SCREENING FOR MALIGNANT NEOPLASM OF COLON: Status: ACTIVE | Noted: 2023-04-13

## 2023-04-13 PROBLEM — J06.9 URTI (ACUTE UPPER RESPIRATORY INFECTION): Status: ACTIVE | Noted: 2023-04-13

## 2023-04-13 PROBLEM — M54.2 CHRONIC NECK PAIN: Status: ACTIVE | Noted: 2023-04-13

## 2023-04-13 PROBLEM — M48.02 FORAMINAL STENOSIS OF CERVICAL REGION: Status: ACTIVE | Noted: 2023-04-13

## 2023-04-13 PROBLEM — D22.9 MULTIPLE NEVI: Status: ACTIVE | Noted: 2023-04-13

## 2023-04-13 PROBLEM — B34.9 ACUTE VIRAL SYNDROME: Status: ACTIVE | Noted: 2023-04-13

## 2023-04-13 PROBLEM — G89.29 CHRONIC NECK PAIN: Status: ACTIVE | Noted: 2023-04-13

## 2023-04-13 PROBLEM — F17.210 CIGARETTE NICOTINE DEPENDENCE WITHOUT COMPLICATION: Status: ACTIVE | Noted: 2023-04-13

## 2023-04-13 PROBLEM — R07.89 OTHER CHEST PAIN: Status: ACTIVE | Noted: 2023-04-13

## 2023-06-05 ENCOUNTER — TELEPHONE (OUTPATIENT)
Dept: INTERNAL MEDICINE CLINIC | Age: 48
End: 2023-06-05

## 2023-06-05 DIAGNOSIS — F41.1 GENERALIZED ANXIETY DISORDER: Primary | ICD-10-CM

## 2023-06-05 NOTE — TELEPHONE ENCOUNTER
Patient would like to get a referral to see a therapist to discuss his Anxiety & PTSD  Patient has seen a therapist for this before moving into the area  Please see records in chart from [facility]

## 2023-06-12 PROBLEM — J06.9 URTI (ACUTE UPPER RESPIRATORY INFECTION): Status: RESOLVED | Noted: 2023-04-13 | Resolved: 2023-06-12

## 2023-06-12 PROBLEM — Z12.11 ENCOUNTER FOR SCREENING FOR MALIGNANT NEOPLASM OF COLON: Status: RESOLVED | Noted: 2023-04-13 | Resolved: 2023-06-12

## 2023-07-18 ENCOUNTER — TELEPHONE (OUTPATIENT)
Dept: PSYCHIATRY | Facility: CLINIC | Age: 48
End: 2023-07-18

## 2023-07-18 NOTE — TELEPHONE ENCOUNTER
Patient has been added to the Medication Management and Talk Therapy wait list without a referral.    Insurance: AGELON ?  Insurance Type:    Commercial []   Medicaid [x]   [place] (if applicable)   Medicare []  [facility]  Provider Preference: pref fem prov  Virtual: Yes [] No [x]

## 2024-05-29 ENCOUNTER — OFFICE VISIT (OUTPATIENT)
Dept: FAMILY MEDICINE CLINIC | Facility: CLINIC | Age: 49
End: 2024-05-29
Payer: COMMERCIAL

## 2024-05-29 ENCOUNTER — TELEPHONE (OUTPATIENT)
Age: 49
End: 2024-05-29

## 2024-05-29 VITALS
OXYGEN SATURATION: 98 % | HEIGHT: 67 IN | SYSTOLIC BLOOD PRESSURE: 118 MMHG | DIASTOLIC BLOOD PRESSURE: 68 MMHG | BODY MASS INDEX: 23.76 KG/M2 | WEIGHT: 151.4 LBS | RESPIRATION RATE: 18 BRPM | HEART RATE: 61 BPM | TEMPERATURE: 95.8 F

## 2024-05-29 DIAGNOSIS — Z12.11 SCREENING FOR COLON CANCER: ICD-10-CM

## 2024-05-29 DIAGNOSIS — S83.105A ACUTE TRAUMATIC INTERNAL DERANGEMENT OF LEFT KNEE, INITIAL ENCOUNTER: Primary | ICD-10-CM

## 2024-05-29 PROCEDURE — 99203 OFFICE O/P NEW LOW 30 MIN: CPT | Performed by: FAMILY MEDICINE

## 2024-05-29 NOTE — ASSESSMENT & PLAN NOTE
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  On examination patient has medial joint line pain with swelling now at this point several weeks after the injury.  He has limited range of motion and continued pain I will order MRI to rule out MCL tear

## 2024-05-29 NOTE — TELEPHONE ENCOUNTER
Attempted to contact patient to see if there was a location preference patient had. Patient did not answer and there is no voicemail set up. I will contact central scheduling tomorrow when they open to schedule and appointment for the patient.

## 2024-05-29 NOTE — PROGRESS NOTES
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  On examination patient has medial joint line pain with swelling now at this point several weeks after the injury.  He has limited range of motion and continued pain I will order MRI to rule out MCL tear         Relevant Orders    MRI knee left  wo contrast     Other Visit Diagnoses       Screening for colon cancer        Relevant Orders    Cologuard              Subjective:      Patient ID: Win Roque Jr. is a 48 y.o. male.    Acute pain of left knee with pain and swelling after an injury        The following portions of the patient's history were reviewed and updated as appropriate: allergies, current medications, past family history, past medical history, past social history, past surgical history and problem list.    Review of Systems   Constitutional:  Negative for chills, fatigue and fever.   HENT:  Negative for congestion, nosebleeds, rhinorrhea, sinus pressure and sore throat.    Eyes:  Negative for discharge and redness.   Respiratory:  Negative for cough and shortness of breath.    Cardiovascular:  Negative for chest pain, palpitations and leg swelling.   Gastrointestinal:  Negative for abdominal pain, blood in stool and nausea.   Endocrine: Negative for cold intolerance, heat intolerance and polyuria.   Genitourinary:  Negative for dysuria and frequency.   Musculoskeletal:  Negative for arthralgias, back pain and myalgias.   Skin:  Negative for rash.   Neurological:  Negative for dizziness, weakness and headaches.   Hematological:  Negative for adenopathy.   Psychiatric/Behavioral:  Negative for behavioral problems and sleep  "disturbance. The patient is not nervous/anxious.          Objective:      /68 (BP Location: Right arm, Patient Position: Sitting, Cuff Size: Standard)   Pulse 61   Temp (!) 95.8 °F (35.4 °C) (Tympanic)   Resp 18   Ht 5' 6.75\" (1.695 m)   Wt 68.7 kg (151 lb 6.4 oz)   SpO2 98%   BMI 23.89 kg/m²        Physical Exam  Vitals and nursing note reviewed.   Constitutional:       Appearance: Normal appearance. He is well-developed.   HENT:      Head: Normocephalic and atraumatic.      Right Ear: External ear normal.      Left Ear: External ear normal.      Nose: Nose normal.   Eyes:      General: No scleral icterus.     Conjunctiva/sclera: Conjunctivae normal.      Pupils: Pupils are equal, round, and reactive to light.   Neck:      Thyroid: No thyromegaly.      Vascular: No JVD.   Cardiovascular:      Rate and Rhythm: Normal rate and regular rhythm.      Heart sounds: Normal heart sounds. No murmur heard.  Pulmonary:      Effort: Pulmonary effort is normal.      Breath sounds: Normal breath sounds. No wheezing or rales.   Chest:      Chest wall: No tenderness.   Abdominal:      General: Bowel sounds are normal. There is no distension.      Palpations: Abdomen is soft. There is no mass.      Tenderness: There is no abdominal tenderness. There is no guarding or rebound.   Musculoskeletal:         General: No tenderness or deformity. Normal range of motion.      Cervical back: Normal range of motion and neck supple.   Lymphadenopathy:      Cervical: No cervical adenopathy.   Skin:     General: Skin is warm and dry.      Findings: No erythema or rash.   Neurological:      Mental Status: He is alert and oriented to person, place, and time.      Cranial Nerves: No cranial nerve deficit.      Deep Tendon Reflexes: Reflexes are normal and symmetric. Reflexes normal.   Psychiatric:         Behavior: Behavior normal.         Thought Content: Thought content normal.         Judgment: Judgment normal.      " "    Data:    Laboratory Results: I have personally reviewed the pertinent laboratory results/reports   Radiology/Other Diagnostic Testing Results: I have personally reviewed pertinent reports.       Lab Results   Component Value Date    WBC 14.62 (H) 10/26/2022    HGB 15.6 10/26/2022    HCT 45.0 10/26/2022    MCV 91 10/26/2022     10/26/2022     Lab Results   Component Value Date    K 4.3 01/31/2023     01/31/2023    CO2 29 01/31/2023    BUN 11 01/31/2023    CREATININE 1.03 01/31/2023    CALCIUM 9.6 01/31/2023    AST 20 01/31/2023    ALT 15 01/31/2023    ALKPHOS 52 01/31/2023    EGFR 91 01/31/2023     No results found for: \"CHOLESTEROL\"  No results found for: \"HDL\"  No results found for: \"LDLCALC\"  No results found for: \"TRIG\"  No results found for: \"CHOLHDL\"  No results found for: \"EEU0KPMDDCYI\", \"TSH\"  No results found for: \"HGBA1C\"  No results found for: \"PSA\"    Niraj Tapia, DO      "

## 2024-05-29 NOTE — TELEPHONE ENCOUNTER
Patients spouse called in stating they called to make an apt for patient's MRI that was ordered today 5/29. MRI scheduling informed patient the order was not STAT and scheduled MRI for 6/5.    Spouse is inquiring if the order needed to be STAT or if patient can keep apt for 6/5.     Please advise. Patient's spouse would like a return phone call.   Thank you.

## 2024-05-29 NOTE — TELEPHONE ENCOUNTER
Attempted to contact patients emergency contact - no answer, left message asking them to have Win give us a call back

## 2024-05-30 ENCOUNTER — HOSPITAL ENCOUNTER (OUTPATIENT)
Facility: MEDICAL CENTER | Age: 49
Discharge: HOME | End: 2024-05-30
Attending: FAMILY MEDICINE
Payer: COMMERCIAL

## 2024-05-30 ENCOUNTER — APPOINTMENT (OUTPATIENT)
Dept: RADIOLOGY | Facility: MEDICAL CENTER | Age: 49
End: 2024-05-30
Payer: COMMERCIAL

## 2024-05-30 DIAGNOSIS — S83.105A ACUTE TRAUMATIC INTERNAL DERANGEMENT OF LEFT KNEE, INITIAL ENCOUNTER: ICD-10-CM

## 2024-05-30 DIAGNOSIS — S83.105A ACUTE TRAUMATIC INTERNAL DERANGEMENT OF LEFT KNEE, INITIAL ENCOUNTER: Primary | ICD-10-CM

## 2024-05-30 PROCEDURE — 73721 MRI JNT OF LWR EXTRE W/O DYE: CPT

## 2024-05-30 NOTE — TELEPHONE ENCOUNTER
Attempted to contact patient emergency contact again, no answer, left message asking them to have Win give us a call back ASAP.

## 2024-05-30 NOTE — TELEPHONE ENCOUNTER
Patient called back, was transferred from access center. Spoke to patient. Patient said he can do today at [place] or [place]. Spoke to central scheduling and they said Eric the stat coordinator will call the patient back. I called and notified the patient to look out for his call since he does not have a voicemail set up. Patient is expecting Colten call.

## 2024-05-31 ENCOUNTER — TELEPHONE (OUTPATIENT)
Age: 49
End: 2024-05-31

## 2024-05-31 DIAGNOSIS — S83.412A TEAR OF MEDIAL COLLATERAL LIGAMENT OF LEFT KNEE, INITIAL ENCOUNTER: Primary | ICD-10-CM

## 2024-06-04 ENCOUNTER — OFFICE VISIT (OUTPATIENT)
Dept: OBGYN CLINIC | Facility: CLINIC | Age: 49
End: 2024-06-04
Payer: COMMERCIAL

## 2024-06-04 VITALS
DIASTOLIC BLOOD PRESSURE: 73 MMHG | SYSTOLIC BLOOD PRESSURE: 122 MMHG | HEIGHT: 67 IN | BODY MASS INDEX: 23.13 KG/M2 | HEART RATE: 68 BPM | WEIGHT: 147.4 LBS

## 2024-06-04 DIAGNOSIS — G89.29 CHRONIC PAIN OF LEFT KNEE: ICD-10-CM

## 2024-06-04 DIAGNOSIS — S83.412A TEAR OF MEDIAL COLLATERAL LIGAMENT OF LEFT KNEE, INITIAL ENCOUNTER: Primary | ICD-10-CM

## 2024-06-04 DIAGNOSIS — M25.562 CHRONIC PAIN OF LEFT KNEE: ICD-10-CM

## 2024-06-04 PROCEDURE — 99244 OFF/OP CNSLTJ NEW/EST MOD 40: CPT | Performed by: FAMILY MEDICINE

## 2024-06-04 NOTE — PROGRESS NOTES
[facility] [address] [place]  [facility] [id number] [phone]      Assessment:  1. Tear of medial collateral ligament of left knee, initial encounter  -     Ambulatory Referral to Orthopedic Surgery  -     Ambulatory Referral to Physical Therapy; Future  -     Knee Immobilizer  2. Chronic pain of left knee  -     XR knee 3 vw left non injury; Future; Expected date: 06/04/2024  -     Ambulatory Referral to Physical Therapy; Future  -     Knee Immobilizer      Plan:  Patient Instructions   F/u 2 wks  Begin physical therapy  Knee brace immobilizer for 2 wks then transfer to hinged knee.  Icing/heat/OTC pain meds as needed.     Return in about 4 weeks (around 7/2/2024) for Recheck.    Chief Complaint:  Chief Complaint   Patient presents with    Left Knee - Pain       Subjective:   HPI    Patient ID: Win Roque Jr. is a 48 y.o. male     Here c/o L knee pain  Pain started while climbing about 2 months ago  Climbing a tree and was pulled down accidentally and twisted his Knee in the crotch of the tree.  Seen by PCP. MRI ordered.  Having pain today- sharp/dull  Pain walking/moving in bed/twisting/squatting  No pain meds  Took excedrin several months ago  Wearing a knee brace which helps      MRI LEFT KNEE     INDICATION:   S83.105A: Unspecified dislocation of left knee, initial encounter.     COMPARISON: None.     TECHNIQUE:    Multiplanar/multisequence MR of the left knee was performed.  Imaging performed on 3.0T MRI     FINDINGS:     SUBCUTANEOUS TISSUES: Normal     JOINT EFFUSION: No significant joint effusion.     BAKER'S CYST: None.     MENISCI: Intact.     CRUCIATE LIGAMENTS: Intact.     EXTENSOR APPARATUS: Intact. There is however mild thickening of the distal quadriceps tendon compatible with mild tendinosis.     COLLATERAL LIGAMENTS: Minimal edema surrounding the fibers of the medial collateral Petr. There is a tiny low-grade partial tear involving the  "deep fibers of the proximal medial collateral ligament. The lateral collateral ligamentous complex is intact.     ARTICULAR SURFACES: Normal.     BONES: Normal.     MUSCULATURE:  Intact.     IMPRESSION:     Low-grade partial tear proximal fibers of the medial collateral ligament compatible with a low-grade grade 2 sprain.          Review of Systems   Constitutional:  Negative for fatigue and fever.   Respiratory:  Negative for shortness of breath.    Cardiovascular:  Positive for chest pain.   Gastrointestinal:  Negative for abdominal pain and nausea.   Genitourinary:  Negative for dysuria.   Musculoskeletal:  Positive for arthralgias.   Skin:  Negative for rash and wound.   Neurological:  Positive for headaches. Negative for weakness.       Objective:  Vitals:  /73 (BP Location: Right arm, Patient Position: Sitting, Cuff Size: Standard)   Pulse 68   Ht 5' 6.75\" (1.695 m)   Wt 66.9 kg (147 lb 6.4 oz)   BMI 23.26 kg/m²     The following portions of the patient's history were reviewed and updated as appropriate: allergies, current medications, past family history, past medical history, past social history, past surgical history, and problem list.    Physical exam:  Physical Exam  Constitutional:       Appearance: Normal appearance. He is normal weight.   Eyes:      Extraocular Movements: Extraocular movements intact.   Pulmonary:      Effort: Pulmonary effort is normal.   Musculoskeletal:      Cervical back: Normal range of motion.      Left knee: No effusion.      Instability Tests: Medial Luanne test positive and lateral Luanne test positive.   Skin:     General: Skin is warm and dry.   Neurological:      General: No focal deficit present.      Mental Status: He is alert and oriented to person, place, and time. Mental status is at baseline.   Psychiatric:         Mood and Affect: Mood normal.         Behavior: Behavior normal.         Thought Content: Thought content normal.         Judgment: Judgment " normal.       Left Knee Exam     Tenderness   The patient is experiencing tenderness in the medial joint line.    Range of Motion   The patient has normal left knee ROM.    Tests   Luanne:  Medial - positive Lateral - positive  Varus: positive Valgus: positive  Lachman:  Anterior - negative      Drawer:  Anterior - negative       Pivot shift: negative  Patellar apprehension: negative    Other   Swelling: none  Effusion: no effusion present          Observations   Left Knee   Negative for effusion.       I have personally reviewed pertinent films in PACS and my interpretation is MRI L knee-  partial tear MCL.      Jose Armando Huizar MD

## 2024-06-04 NOTE — PATIENT INSTRUCTIONS
F/u 2 wks  Begin physical therapy  Knee brace immobilizer for 2 wks then transfer to hinged knee.  Icing/heat/OTC pain meds as needed.

## 2024-06-04 NOTE — LETTER
June 4, 2024     Niraj Tapia DO  [address] [place] [id number]    Patient: Win Roque Jr.   YOB: 1975   Date of Visit: 6/4/2024       Dear Dr. Tapia:    Thank you for referring Win Roque to me for evaluation. Below are my notes for this consultation.    If you have questions, please do not hesitate to call me. I look forward to following your patient along with you.         Sincerely,        Jose Armando Huizar MD        CC: No Recipients    Jose Armando Huizar MD  6/4/2024  9:03 AM  Signed  [facility] [address] [address] [phone]      Assessment:  1. Tear of medial collateral ligament of left knee, initial encounter  -     Ambulatory Referral to Orthopedic Surgery  -     Ambulatory Referral to Physical Therapy; Future  -     Brace  2. Chronic pain of left knee  -     XR knee 3 vw left non injury; Future; Expected date: 06/04/2024  -     Ambulatory Referral to Physical Therapy; Future  -     Brace      Plan:  Patient Instructions   F/u 4 wks  Begin physical therapy  Knee brace  Icing/heat/OTC pain meds as needed.     Return in about 4 weeks (around 7/2/2024) for Recheck.    Chief Complaint:  Chief Complaint   Patient presents with   • Left Knee - Pain       Subjective:   HPI    Patient ID: Win Roque Jr. is a 48 y.o. male     Here c/o L knee pain  Pain started while climbing about 2 months ago  Climbing a tree and was pulled down accidentally and twisted his Knee in the crotch of the tree.  Seen by PCP. MRI ordered.  Having pain today- sharp/dull  Pain walking/moving in bed/twisting/squatting  No pain meds  Took excedrin several months ago  Wearing a knee brace which helps      MRI LEFT KNEE     INDICATION:   S83.105A: Unspecified dislocation of left knee, initial encounter.     COMPARISON: None.     TECHNIQUE:    Multiplanar/multisequence MR of the left knee was performed.  Imaging performed on  "3.0T MRI     FINDINGS:     SUBCUTANEOUS TISSUES: Normal     JOINT EFFUSION: No significant joint effusion.     BAKER'S CYST: None.     MENISCI: Intact.     CRUCIATE LIGAMENTS: Intact.     EXTENSOR APPARATUS: Intact. There is however mild thickening of the distal quadriceps tendon compatible with mild tendinosis.     COLLATERAL LIGAMENTS: Minimal edema surrounding the fibers of the medial collateral Petr. There is a tiny low-grade partial tear involving the deep fibers of the proximal medial collateral ligament. The lateral collateral ligamentous complex is intact.     ARTICULAR SURFACES: Normal.     BONES: Normal.     MUSCULATURE:  Intact.     IMPRESSION:     Low-grade partial tear proximal fibers of the medial collateral ligament compatible with a low-grade grade 2 sprain.          Review of Systems   Constitutional:  Negative for fatigue and fever.   Respiratory:  Negative for shortness of breath.    Cardiovascular:  Positive for chest pain.   Gastrointestinal:  Negative for abdominal pain and nausea.   Genitourinary:  Negative for dysuria.   Musculoskeletal:  Positive for arthralgias.   Skin:  Negative for rash and wound.   Neurological:  Positive for headaches. Negative for weakness.       Objective:  Vitals:  /73 (BP Location: Right arm, Patient Position: Sitting, Cuff Size: Standard)   Pulse 68   Ht 5' 6.75\" (1.695 m)   Wt 66.9 kg (147 lb 6.4 oz)   BMI 23.26 kg/m²     The following portions of the patient's history were reviewed and updated as appropriate: allergies, current medications, past family history, past medical history, past social history, past surgical history, and problem list.    Physical exam:  Physical Exam  Constitutional:       Appearance: Normal appearance. He is normal weight.   Eyes:      Extraocular Movements: Extraocular movements intact.   Pulmonary:      Effort: Pulmonary effort is normal.   Musculoskeletal:      Cervical back: Normal range of motion.      Left knee: No " effusion.      Instability Tests: Medial Luanne test positive and lateral Luanne test positive.   Skin:     General: Skin is warm and dry.   Neurological:      General: No focal deficit present.      Mental Status: He is alert and oriented to person, place, and time. Mental status is at baseline.   Psychiatric:         Mood and Affect: Mood normal.         Behavior: Behavior normal.         Thought Content: Thought content normal.         Judgment: Judgment normal.       Left Knee Exam     Tenderness   The patient is experiencing tenderness in the medial joint line.    Range of Motion   The patient has normal left knee ROM.    Tests   Luanne:  Medial - positive Lateral - positive  Varus: positive Valgus: positive  Lachman:  Anterior - negative      Drawer:  Anterior - negative       Pivot shift: negative  Patellar apprehension: negative    Other   Swelling: none  Effusion: no effusion present          Observations   Left Knee   Negative for effusion.       I have personally reviewed pertinent films in PACS and my interpretation is MRI L knee-  partial tear MCL.      Jose Armando Huizar MD

## 2024-06-06 ENCOUNTER — TELEPHONE (OUTPATIENT)
Age: 49
End: 2024-06-06

## 2024-06-06 NOTE — TELEPHONE ENCOUNTER
Caller: Patient    Doctor: Gaye    Reason for call:     Patient is calling about his left knee, the brace he has is very painful and not helping him and   Cannot get around.  He states there is another brace the Dr was recommended with hinges, he   Would like to try the other brace.  States the one he has is very uncomfortable.  Can he order the other brace??  Please advise...    Call back#: [phone]

## 2024-06-07 ENCOUNTER — OFFICE VISIT (OUTPATIENT)
Dept: OBGYN CLINIC | Facility: CLINIC | Age: 49
End: 2024-06-07
Payer: COMMERCIAL

## 2024-06-07 VITALS
TEMPERATURE: 97.3 F | SYSTOLIC BLOOD PRESSURE: 105 MMHG | BODY MASS INDEX: 23.39 KG/M2 | WEIGHT: 149 LBS | HEIGHT: 67 IN | DIASTOLIC BLOOD PRESSURE: 67 MMHG | HEART RATE: 62 BPM

## 2024-06-07 DIAGNOSIS — S83.412D TEAR OF MEDIAL COLLATERAL LIGAMENT OF LEFT KNEE, SUBSEQUENT ENCOUNTER: Primary | ICD-10-CM

## 2024-06-07 PROCEDURE — 99213 OFFICE O/P EST LOW 20 MIN: CPT | Performed by: FAMILY MEDICINE

## 2024-06-07 NOTE — PROGRESS NOTES
"[facility] [address] [address] [phone]      Assessment:  1. Tear of medial collateral ligament of left knee, subsequent encounter  -     Brace      Plan:  Patient Instructions   F/u next scheduled appt.  Hinged knee brace  Icing/heat/OTC pain meds as needed.     Return in about 2 weeks (around 6/21/2024) for Recheck.    Chief Complaint:  Chief Complaint   Patient presents with    Left Knee - Follow-up       Subjective:   HPI    Patient ID: Win Roque Jr. is a 48 y.o. male     Here for f/u  L knee pain/MCL sprain  Still having pain  Can't sit still in knee immobilizer  Denies calf pain/CP/SOB  Having pain today- sharp/dull  Pain walking/moving in bed/twisting/squatting  No pain meds but smokes pot      Review of Systems   Constitutional:  Negative for fatigue and fever.   Respiratory:  Negative for shortness of breath.    Cardiovascular:  Negative for chest pain.   Gastrointestinal:  Negative for abdominal pain and nausea.   Genitourinary:  Negative for dysuria.   Musculoskeletal:  Positive for arthralgias.   Skin:  Negative for rash and wound.   Neurological:  Negative for weakness and headaches.       Objective:  Vitals:  /67 (BP Location: Left arm, Patient Position: Sitting, Cuff Size: Standard)   Pulse 62   Temp (!) 97.3 °F (36.3 °C) (Tympanic)   Ht 5' 6.75\" (1.695 m)   Wt 67.6 kg (149 lb)   BMI 23.51 kg/m²     The following portions of the patient's history were reviewed and updated as appropriate: allergies, current medications, past family history, past medical history, past social history, past surgical history, and problem list.    Physical exam:  Physical Exam  Constitutional:       Appearance: Normal appearance. He is normal weight.   Eyes:      Extraocular Movements: Extraocular movements intact.   Pulmonary:      Effort: Pulmonary effort is normal.   Musculoskeletal:      Cervical back: Normal range " of motion.      Left knee: No effusion.   Skin:     General: Skin is warm and dry.   Neurological:      General: No focal deficit present.      Mental Status: He is alert and oriented to person, place, and time. Mental status is at baseline.   Psychiatric:         Mood and Affect: Mood normal.         Behavior: Behavior normal.         Thought Content: Thought content normal.         Judgment: Judgment normal.       Left Knee Exam     Tenderness   The patient is experiencing tenderness in the MCL.    Tests   Varus: negative Valgus: positive    Other   Swelling: none  Effusion: no effusion present          Observations   Left Knee   Negative for effusion.             Jose Armando Huizar MD

## 2024-06-24 ENCOUNTER — OFFICE VISIT (OUTPATIENT)
Dept: OBGYN CLINIC | Facility: CLINIC | Age: 49
End: 2024-06-24
Payer: COMMERCIAL

## 2024-06-24 VITALS
HEIGHT: 67 IN | BODY MASS INDEX: 22.44 KG/M2 | WEIGHT: 143 LBS | TEMPERATURE: 99.1 F | SYSTOLIC BLOOD PRESSURE: 128 MMHG | HEART RATE: 79 BPM | DIASTOLIC BLOOD PRESSURE: 74 MMHG

## 2024-06-24 DIAGNOSIS — S83.412D TEAR OF MEDIAL COLLATERAL LIGAMENT OF LEFT KNEE, SUBSEQUENT ENCOUNTER: Primary | ICD-10-CM

## 2024-06-24 PROCEDURE — 99213 OFFICE O/P EST LOW 20 MIN: CPT | Performed by: FAMILY MEDICINE

## 2024-06-24 NOTE — PATIENT INSTRUCTIONS
F/u  3 wks  Begin physical therapy  Continue wearing knee brace  Icing/heat/OTC pain meds as needed.

## 2024-06-24 NOTE — PROGRESS NOTES
"[facility] [address] [address] [phone]      Assessment:  1. Tear of medial collateral ligament of left knee, subsequent encounter      Plan:  Patient Instructions   F/u  3 wks  Begin physical therapy  Continue wearing knee brace  Icing/heat/OTC pain meds as needed.     Return in about 3 weeks (around 7/15/2024) for Recheck.    Chief Complaint:  Chief Complaint   Patient presents with    Left Knee - Follow-up       Subjective:   HPI    Patient ID: Win Roque Jr. is a 48 y.o. male     Here for f/u  L knee pain/MCL sprain  Still having pain- getting better  Worse pain in the am..  Wearing knee brace- helping   sharp/dull  Pain walking in the am/twisting/squatting  No pain meds but smokes pot  Hasn't started PT yet- missed appt.    Review of Systems   Constitutional:  Negative for fatigue and fever.   Respiratory:  Negative for shortness of breath.    Cardiovascular:  Negative for chest pain.   Gastrointestinal:  Negative for abdominal pain and nausea.   Genitourinary:  Negative for dysuria.   Musculoskeletal:  Positive for arthralgias.   Skin:  Negative for rash and wound.   Neurological:  Negative for weakness and headaches.       Objective:  Vitals:  /74 (BP Location: Right arm, Patient Position: Sitting, Cuff Size: Large)   Pulse 79   Temp 99.1 °F (37.3 °C) (Tympanic)   Ht 5' 6.75\" (1.695 m)   Wt 64.9 kg (143 lb)   BMI 22.57 kg/m²     The following portions of the patient's history were reviewed and updated as appropriate: allergies, current medications, past family history, past medical history, past social history, past surgical history, and problem list.    Physical exam:  Physical Exam  Constitutional:       Appearance: Normal appearance. He is normal weight.   Eyes:      Extraocular Movements: Extraocular movements intact.   Pulmonary:      Effort: Pulmonary effort is normal.   Musculoskeletal:         " General: Tenderness present.      Cervical back: Normal range of motion.      Left knee: No effusion.      Instability Tests: Medial Luanne test positive. Lateral Luanne test negative.   Skin:     General: Skin is warm and dry.   Neurological:      General: No focal deficit present.      Mental Status: He is alert and oriented to person, place, and time. Mental status is at baseline.   Psychiatric:         Mood and Affect: Mood normal.         Behavior: Behavior normal.         Thought Content: Thought content normal.         Judgment: Judgment normal.       Left Knee Exam     Tenderness   The patient is experiencing tenderness in the medial joint line.    Range of Motion   The patient has normal left knee ROM.    Tests   Luanne:  Medial - positive Lateral - negative  Varus: negative Valgus: positive    Other   Swelling: none  Effusion: no effusion present          Observations   Left Knee   Negative for effusion.             Jose Armando Huizar MD

## 2024-06-28 ENCOUNTER — TELEPHONE (OUTPATIENT)
Dept: PSYCHIATRY | Facility: CLINIC | Age: 49
End: 2024-06-28

## 2024-06-28 NOTE — TELEPHONE ENCOUNTER
Contacted patient off of NON-REFERRAL to verify needs of services in attempts to offer patient an appointment at [facility] . Unable to  LVM for patient to contact intake dept  in regards to wait list due to vm not being set up

## 2024-07-10 ENCOUNTER — APPOINTMENT (OUTPATIENT)
Dept: LAB | Facility: CLINIC | Age: 49
End: 2024-07-10
Payer: COMMERCIAL

## 2024-07-10 ENCOUNTER — OFFICE VISIT (OUTPATIENT)
Dept: FAMILY MEDICINE CLINIC | Facility: CLINIC | Age: 49
End: 2024-07-10
Payer: COMMERCIAL

## 2024-07-10 VITALS
BODY MASS INDEX: 22.81 KG/M2 | WEIGHT: 145.3 LBS | DIASTOLIC BLOOD PRESSURE: 60 MMHG | HEIGHT: 67 IN | HEART RATE: 72 BPM | SYSTOLIC BLOOD PRESSURE: 112 MMHG | RESPIRATION RATE: 18 BRPM | OXYGEN SATURATION: 97 % | TEMPERATURE: 98.2 F

## 2024-07-10 DIAGNOSIS — H53.9 VISION CHANGES: ICD-10-CM

## 2024-07-10 DIAGNOSIS — R51.9 NEW ONSET HEADACHE: Primary | ICD-10-CM

## 2024-07-10 DIAGNOSIS — M54.2 NECK PAIN: ICD-10-CM

## 2024-07-10 DIAGNOSIS — R63.1 EXCESSIVE THIRST: ICD-10-CM

## 2024-07-10 DIAGNOSIS — M79.10 MYALGIA: ICD-10-CM

## 2024-07-10 DIAGNOSIS — Z12.11 SCREENING FOR COLON CANCER: Primary | ICD-10-CM

## 2024-07-10 PROBLEM — F10.930 ALCOHOL WITHDRAWAL SYNDROME WITHOUT COMPLICATION (HCC): Status: RESOLVED | Noted: 2022-08-10 | Resolved: 2024-07-10

## 2024-07-10 LAB
ALBUMIN SERPL BCG-MCNC: 4.5 G/DL (ref 3.5–5)
ALP SERPL-CCNC: 54 U/L (ref 34–104)
ALT SERPL W P-5'-P-CCNC: 12 U/L (ref 7–52)
ANION GAP SERPL CALCULATED.3IONS-SCNC: 14 MMOL/L (ref 4–13)
AST SERPL W P-5'-P-CCNC: 14 U/L (ref 13–39)
BASOPHILS # BLD AUTO: 0.1 THOUSANDS/ÂΜL (ref 0–0.1)
BASOPHILS NFR BLD AUTO: 1 % (ref 0–1)
BILIRUB SERPL-MCNC: 1.15 MG/DL (ref 0.2–1)
BILIRUB UR QL STRIP: NEGATIVE
BUN SERPL-MCNC: 14 MG/DL (ref 5–25)
CALCIUM SERPL-MCNC: 9.6 MG/DL (ref 8.4–10.2)
CHLORIDE SERPL-SCNC: 102 MMOL/L (ref 96–108)
CLARITY UR: CLEAR
CO2 SERPL-SCNC: 28 MMOL/L (ref 21–32)
COLOR UR: NORMAL
CREAT SERPL-MCNC: 1.08 MG/DL (ref 0.6–1.3)
EOSINOPHIL # BLD AUTO: 0.25 THOUSAND/ÂΜL (ref 0–0.61)
EOSINOPHIL NFR BLD AUTO: 2 % (ref 0–6)
ERYTHROCYTE [DISTWIDTH] IN BLOOD BY AUTOMATED COUNT: 12.8 % (ref 11.6–15.1)
EST. AVERAGE GLUCOSE BLD GHB EST-MCNC: 114 MG/DL
GFR SERPL CREATININE-BSD FRML MDRD: 81 ML/MIN/1.73SQ M
GLUCOSE P FAST SERPL-MCNC: 75 MG/DL (ref 65–99)
GLUCOSE UR STRIP-MCNC: NEGATIVE MG/DL
HBA1C MFR BLD: 5.6 %
HCT VFR BLD AUTO: 46.5 % (ref 36.5–49.3)
HGB BLD-MCNC: 16.4 G/DL (ref 12–17)
HGB UR QL STRIP.AUTO: NEGATIVE
IMM GRANULOCYTES # BLD AUTO: 0.05 THOUSAND/UL (ref 0–0.2)
IMM GRANULOCYTES NFR BLD AUTO: 0 % (ref 0–2)
KETONES UR STRIP-MCNC: NEGATIVE MG/DL
LEUKOCYTE ESTERASE UR QL STRIP: NEGATIVE
LYMPHOCYTES # BLD AUTO: 3.8 THOUSANDS/ÂΜL (ref 0.6–4.47)
LYMPHOCYTES NFR BLD AUTO: 24 % (ref 14–44)
MCH RBC QN AUTO: 31.4 PG (ref 26.8–34.3)
MCHC RBC AUTO-ENTMCNC: 35.3 G/DL (ref 31.4–37.4)
MCV RBC AUTO: 89 FL (ref 82–98)
MONOCYTES # BLD AUTO: 0.64 THOUSAND/ÂΜL (ref 0.17–1.22)
MONOCYTES NFR BLD AUTO: 4 % (ref 4–12)
NEUTROPHILS # BLD AUTO: 10.86 THOUSANDS/ÂΜL (ref 1.85–7.62)
NEUTS SEG NFR BLD AUTO: 69 % (ref 43–75)
NITRITE UR QL STRIP: NEGATIVE
NRBC BLD AUTO-RTO: 0 /100 WBCS
PH UR STRIP.AUTO: 6 [PH]
PLATELET # BLD AUTO: 296 THOUSANDS/UL (ref 149–390)
PMV BLD AUTO: 9.9 FL (ref 8.9–12.7)
POTASSIUM SERPL-SCNC: 4.1 MMOL/L (ref 3.5–5.3)
PROT SERPL-MCNC: 7.3 G/DL (ref 6.4–8.4)
PROT UR STRIP-MCNC: NEGATIVE MG/DL
RBC # BLD AUTO: 5.23 MILLION/UL (ref 3.88–5.62)
SODIUM SERPL-SCNC: 144 MMOL/L (ref 135–147)
SP GR UR STRIP.AUTO: 1.01 (ref 1–1.03)
TSH SERPL DL<=0.05 MIU/L-ACNC: 1.48 UIU/ML (ref 0.45–4.5)
UROBILINOGEN UR STRIP-ACNC: <2 MG/DL
WBC # BLD AUTO: 15.7 THOUSAND/UL (ref 4.31–10.16)

## 2024-07-10 PROCEDURE — 85025 COMPLETE CBC W/AUTO DIFF WBC: CPT

## 2024-07-10 PROCEDURE — 81003 URINALYSIS AUTO W/O SCOPE: CPT

## 2024-07-10 PROCEDURE — 83036 HEMOGLOBIN GLYCOSYLATED A1C: CPT

## 2024-07-10 PROCEDURE — 80053 COMPREHEN METABOLIC PANEL: CPT

## 2024-07-10 PROCEDURE — 99214 OFFICE O/P EST MOD 30 MIN: CPT | Performed by: NURSE PRACTITIONER

## 2024-07-10 PROCEDURE — 84443 ASSAY THYROID STIM HORMONE: CPT

## 2024-07-10 PROCEDURE — 86618 LYME DISEASE ANTIBODY: CPT

## 2024-07-10 PROCEDURE — 36415 COLL VENOUS BLD VENIPUNCTURE: CPT

## 2024-07-10 NOTE — PROGRESS NOTES
"OFFICE VISIT  Win Roque Jr. 48 y.o. male MRN: [id number]      Tobacco Cessation Counseling: The patient is sincerely urged to quit consumption of tobacco. He is not ready to quit tobacco.          Assessment / Plan:  Problem List Items Addressed This Visit    None  Visit Diagnoses       New onset headache    -  Primary    Relevant Orders    CT head wo contrast    Vision changes        Relevant Orders    CT head wo contrast    Excessive thirst        Relevant Orders    Hemoglobin A1C    CBC and differential    Comprehensive metabolic panel    TSH, 3rd generation with Free T4 reflex    UA (URINE) with reflex to Scope    Neck pain        Relevant Orders    XR spine cervical complete 4 or 5 vw non injury    Myalgia        Relevant Orders    Lyme Total AB W Reflex to IGM/IGG        New and requires workup   Schedule an appt with opthalmology        Reason For Visit / Chief Complaint  Chief Complaint   Patient presents with    Headache    Migraine     Unsure , months loose wet stool. 1-2 times daily , mini stroke 2 years ago         HPI:  Win Roque Jr. is a 48 y.o. male who presents today for headaches. He reports this is new. He reports headaches started two years ago, reports having a \"mini stroke\" He tells me he is unable to get \" fluids in\" He reports headaches comes and goes. He reports \"prism changes\" to right eye. He reports vision changes. He report headaches, which are different    Drinking water, appetite good, losing weight. 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       Historical Information   Past Medical History:   Diagnosis Date    DVT (deep venous thrombosis) (HCC)      Past Surgical History:   Procedure Laterality Date    CYST REMOVAL      UVULECTOMY       Social History   Social History     Substance and Sexual Activity   Alcohol Use Never     Social History     Substance and Sexual Activity   Drug Use Yes    Types: Marijuana    Comment: " medical     Social History     Tobacco Use   Smoking Status Every Day    Current packs/day: 0.00    Average packs/day: 0.5 packs/day for 26.0 years (13.0 ttl pk-yrs)    Types: Cigarettes    Start date:     Last attempt to quit:     Years since quittin.5   Smokeless Tobacco Former    Types: Chew    Quit date: 2016     Family History   Problem Relation Age of Onset    No Known Problems Mother     No Known Problems Father        Meds/Allergies   Allergies   Allergen Reactions    Amitriptyline     Ibuprofen Fever    Toradol [Ketorolac Tromethamine] Hives    Valproic Acid     Ibuprofen Fever and Rash     Gets very flushed, runs high fever  Other reaction(s): Fever  Gets very flushed, runs high fever  Gets very flushed, runs high fever       Meds:    Current Outpatient Medications:     diphenhydrAMINE (BENADRYL) 25 mg tablet, Take 1 tablet by mouth every 6 (six) hours as needed for itching (Patient not taking: Reported on 2018), Disp: 30 tablet, Rfl: 0    fluticasone (FLONASE) 50 mcg/act nasal spray, 1 spray into each nostril daily (Patient not taking: Reported on 2024), Disp: 16 g, Rfl: 0    Hydrocortisone, Perianal, (Proctocort) 1 % CREA, Apply to rectum twice a day as needed for pain associated with hemorrhoids (Patient not taking: Reported on 2024), Disp: 28 g, Rfl: 0    predniSONE 10 mg tablet, Take 5 tabs on day 1 and 2, take 4 tabs on day 3 and 4, take 3 tabs on day 5 and 6, take 2 tabs on day 7 and 8, take 1 tab on day 9 and 10 (Patient not taking: Reported on 2018), Disp: 30 tablet, Rfl: 0      REVIEW OF SYSTEMS  Review of Systems   Constitutional:  Positive for fatigue. Negative for activity change, chills and fever.   HENT:  Negative for congestion, ear discharge, ear pain, sinus pressure, sinus pain, sore throat, tinnitus and trouble swallowing.    Eyes:  Positive for photophobia and visual disturbance. Negative for pain, discharge and itching.   Respiratory:  Negative for  "cough, chest tightness, shortness of breath and wheezing.    Cardiovascular:  Negative for chest pain and leg swelling.   Gastrointestinal:  Negative for abdominal distention, abdominal pain, constipation, diarrhea, nausea and vomiting.   Endocrine: Negative for polydipsia, polyphagia and polyuria.   Genitourinary:  Negative for dysuria and frequency.   Musculoskeletal:  Positive for neck pain. Negative for arthralgias, myalgias and neck stiffness.   Skin:  Negative for color change.   Neurological:  Positive for headaches. Negative for dizziness, syncope, weakness and numbness.   Hematological:  Does not bruise/bleed easily.   Psychiatric/Behavioral:  Negative for behavioral problems, confusion, self-injury, sleep disturbance and suicidal ideas. The patient is not nervous/anxious.            Current Vitals:   Blood Pressure: 112/60 (07/10/24 1130)  Pulse: 72 (07/10/24 1130)  Temperature: 98.2 °F (36.8 °C) (07/10/24 1130)  Temp Source: Tympanic (07/10/24 1130)  Respirations: 18 (07/10/24 1130)  Height: 5' 6.75\" (169.5 cm) (07/10/24 1130)  Weight - Scale: 65.9 kg (145 lb 4.8 oz) (07/10/24 1130)  SpO2: 97 % (07/10/24 1130)  [unfilled]    PHYSICAL EXAMS:  Physical Exam  Vitals and nursing note reviewed.   Constitutional:       Appearance: Normal appearance. He is well-developed.   HENT:      Head: Normocephalic and atraumatic.      Right Ear: Tympanic membrane, ear canal and external ear normal.      Left Ear: Tympanic membrane, ear canal and external ear normal.      Nose: Nose normal. No congestion or rhinorrhea.      Mouth/Throat:      Mouth: Mucous membranes are moist.      Pharynx: No oropharyngeal exudate or posterior oropharyngeal erythema.   Eyes:      General:         Right eye: No discharge.         Left eye: No discharge.      Conjunctiva/sclera: Conjunctivae normal.      Pupils: Pupils are equal, round, and reactive to light.   Neck:      Thyroid: No thyromegaly.   Cardiovascular:      Rate and Rhythm: " Normal rate and regular rhythm.      Pulses: Normal pulses.      Heart sounds: Normal heart sounds.   Pulmonary:      Effort: Pulmonary effort is normal.      Breath sounds: Normal breath sounds. No wheezing or rhonchi.   Abdominal:      General: Bowel sounds are normal. There is no distension.      Palpations: Abdomen is soft.      Tenderness: There is no abdominal tenderness.   Musculoskeletal:         General: No swelling, tenderness or deformity. Normal range of motion.      Cervical back: Normal range of motion and neck supple.      Right lower leg: No edema.      Left lower leg: No edema.   Skin:     General: Skin is warm and dry.      Findings: No erythema or rash.   Neurological:      General: No focal deficit present.      Mental Status: He is alert and oriented to person, place, and time.   Psychiatric:         Mood and Affect: Mood normal.         Behavior: Behavior normal.         Thought Content: Thought content normal.         Judgment: Judgment normal.             Lab, imaging and other studies: .

## 2024-07-11 ENCOUNTER — TELEPHONE (OUTPATIENT)
Age: 49
End: 2024-07-11

## 2024-07-11 ENCOUNTER — TELEPHONE (OUTPATIENT)
Dept: FAMILY MEDICINE CLINIC | Facility: CLINIC | Age: 49
End: 2024-07-11

## 2024-07-11 DIAGNOSIS — N28.9 KIDNEY DISEASE: Primary | ICD-10-CM

## 2024-07-11 DIAGNOSIS — D72.829 LEUKOCYTOSIS, UNSPECIFIED TYPE: Primary | ICD-10-CM

## 2024-07-11 DIAGNOSIS — D72.829 LEUKOCYTOSIS, UNSPECIFIED TYPE: ICD-10-CM

## 2024-07-11 LAB — B BURGDOR IGG+IGM SER QL IA: NEGATIVE

## 2024-07-11 NOTE — TELEPHONE ENCOUNTER
Patient called, states he had some labs done yesterday and saw the results were back in mychart. Wondering if someone would be able to discuss results with him.       Please advise.

## 2024-07-16 ENCOUNTER — OFFICE VISIT (OUTPATIENT)
Dept: FAMILY MEDICINE CLINIC | Facility: CLINIC | Age: 49
End: 2024-07-16
Payer: COMMERCIAL

## 2024-07-16 ENCOUNTER — TELEPHONE (OUTPATIENT)
Dept: FAMILY MEDICINE CLINIC | Facility: CLINIC | Age: 49
End: 2024-07-16

## 2024-07-16 VITALS
RESPIRATION RATE: 18 BRPM | WEIGHT: 146.2 LBS | TEMPERATURE: 98.2 F | BODY MASS INDEX: 22.95 KG/M2 | HEART RATE: 79 BPM | DIASTOLIC BLOOD PRESSURE: 74 MMHG | HEIGHT: 67 IN | SYSTOLIC BLOOD PRESSURE: 120 MMHG | OXYGEN SATURATION: 98 %

## 2024-07-16 DIAGNOSIS — G43.109 OCULAR MIGRAINE: Primary | ICD-10-CM

## 2024-07-16 PROCEDURE — 99213 OFFICE O/P EST LOW 20 MIN: CPT | Performed by: FAMILY MEDICINE

## 2024-07-16 NOTE — TELEPHONE ENCOUNTER
Pt was seen in office on 07/10/2024 by Susy Mcleod for new onset headaches  Susy had order a CT scan Head WO contrast but it was denied by patient insurance company for the following reason:     Your doctor's records say you have headaches.   With what was received from your doctor, a decision was made that headaches alone is not a reason for   a(n) Brain CT. To approve, we need doctor's notes that say your headaches are worse (increased frequency   or intensity). We also need to know why you cannot do a different test (MRI (Magnetic Resonance   Imaging)).     Pt is coming in for a follow up apt today at 6pm in regards to headaches in hope of more documentation on file for approval from the insurance for CT Scan

## 2024-07-16 NOTE — ASSESSMENT & PLAN NOTE
Patient notes peripheral field visual loss at times and extreme light sensitivity with headaches that occur about once per week symptoms worsened with current heat wave.  Nausea and dizziness occurs all associated with this.  Over-the-counter medications not completely helping he has been trying to hydrate more as well.  Patient is a  and notes that he has metal fragments in both his left lateral chest wall and possibly in his left lower extremity as well as left eye from previous metallic fragments removed.  Patient cannot safely undergo MRI and will need a CT of the head

## 2024-07-16 NOTE — PROGRESS NOTES
Assessment/Plan:       Problem List Items Addressed This Visit          Cardiovascular and Mediastinum    Ocular migraine - Primary     Patient notes peripheral field visual loss at times and extreme light sensitivity with headaches that occur about once per week symptoms worsened with current heat wave.  Nausea and dizziness occurs all associated with this.  Over-the-counter medications not completely helping he has been trying to hydrate more as well.  Patient is a  and notes that he has metal fragments in both his left lateral chest wall and possibly in his left lower extremity as well as left eye from previous metallic fragments removed.  Patient cannot safely undergo MRI and will need a CT of the head         Relevant Orders    CT head wo contrast         Subjective:      Patient ID: Win Roque Jr. is a 48 y.o. male.    Patient presents today for significant migraine headaches ocular type by description requesting further workup and evaluation.  Patient works as a         The following portions of the patient's history were reviewed and updated as appropriate: allergies, current medications, past family history, past medical history, past social history, past surgical history and problem list.    Review of Systems   Constitutional:  Negative for chills, fatigue and fever.   HENT:  Negative for congestion, nosebleeds, rhinorrhea, sinus pressure and sore throat.    Eyes:  Negative for discharge and redness.   Respiratory:  Negative for cough and shortness of breath.    Cardiovascular:  Negative for chest pain, palpitations and leg swelling.   Gastrointestinal:  Negative for abdominal pain, blood in stool and nausea.   Endocrine: Negative for cold intolerance, heat intolerance and polyuria.   Genitourinary:  Negative for dysuria and frequency.   Musculoskeletal:  Negative for arthralgias, back pain and myalgias.   Skin:  Negative for rash.   Neurological:  Positive for dizziness and headaches.  "Negative for weakness.   Hematological:  Negative for adenopathy.   Psychiatric/Behavioral:  Negative for behavioral problems and sleep disturbance. The patient is not nervous/anxious.          Objective:      /74 (BP Location: Right arm, Patient Position: Sitting, Cuff Size: Standard)   Pulse 79   Temp 98.2 °F (36.8 °C) (Tympanic)   Resp 18   Ht 5' 6.75\" (1.695 m)   Wt 66.3 kg (146 lb 3.2 oz)   SpO2 98%   BMI 23.07 kg/m²        Physical Exam  Vitals and nursing note reviewed.   Constitutional:       Appearance: Normal appearance. He is well-developed.   HENT:      Head: Normocephalic and atraumatic.      Right Ear: External ear normal.      Left Ear: External ear normal.      Nose: Nose normal.   Eyes:      General: No scleral icterus.     Conjunctiva/sclera: Conjunctivae normal.      Pupils: Pupils are equal, round, and reactive to light.   Neck:      Thyroid: No thyromegaly.      Vascular: No JVD.   Cardiovascular:      Rate and Rhythm: Normal rate and regular rhythm.      Pulses: Normal pulses.      Heart sounds: Normal heart sounds. No murmur heard.  Pulmonary:      Effort: Pulmonary effort is normal.      Breath sounds: Normal breath sounds. No wheezing or rales.   Chest:      Chest wall: No tenderness.   Abdominal:      General: Bowel sounds are normal. There is no distension.      Palpations: Abdomen is soft. There is no mass.      Tenderness: There is no abdominal tenderness. There is no guarding or rebound.   Musculoskeletal:         General: No tenderness or deformity. Normal range of motion.      Cervical back: Normal range of motion and neck supple.   Lymphadenopathy:      Cervical: No cervical adenopathy.   Skin:     General: Skin is warm and dry.      Findings: No erythema or rash.   Neurological:      Mental Status: He is alert and oriented to person, place, and time.      Cranial Nerves: No cranial nerve deficit.      Deep Tendon Reflexes: Reflexes are normal and symmetric. Reflexes " "normal.   Psychiatric:         Behavior: Behavior normal.         Thought Content: Thought content normal.         Judgment: Judgment normal.          Data:    Laboratory Results: I have personally reviewed the pertinent laboratory results/reports   Radiology/Other Diagnostic Testing Results: I have personally reviewed pertinent reports.       Lab Results   Component Value Date    WBC 15.70 (H) 07/10/2024    HGB 16.4 07/10/2024    HCT 46.5 07/10/2024    MCV 89 07/10/2024     07/10/2024     Lab Results   Component Value Date    K 4.1 07/10/2024     07/10/2024    CO2 28 07/10/2024    BUN 14 07/10/2024    CREATININE 1.08 07/10/2024    GLUF 75 07/10/2024    CALCIUM 9.6 07/10/2024    AST 14 07/10/2024    ALT 12 07/10/2024    ALKPHOS 54 07/10/2024    EGFR 81 07/10/2024     No results found for: \"CHOLESTEROL\"  No results found for: \"HDL\"  No results found for: \"LDLCALC\"  No results found for: \"TRIG\"  No results found for: \"CHOLHDL\"  Lab Results   Component Value Date    CWS6HYIKMEPB 1.483 07/10/2024     Lab Results   Component Value Date    HGBA1C 5.6 07/10/2024     No results found for: \"PSA\"    Niraj Tapia, DO      "

## 2024-07-22 DIAGNOSIS — N18.9 CHRONIC KIDNEY DISEASE, UNSPECIFIED CKD STAGE: Primary | ICD-10-CM

## 2024-07-25 ENCOUNTER — APPOINTMENT (OUTPATIENT)
Dept: LAB | Facility: CLINIC | Age: 49
End: 2024-07-25
Payer: COMMERCIAL

## 2024-07-25 DIAGNOSIS — N18.9 CHRONIC KIDNEY DISEASE, UNSPECIFIED CKD STAGE: ICD-10-CM

## 2024-07-25 LAB
CREAT UR-MCNC: 183.9 MG/DL
CREAT UR-MCNC: 183.9 MG/DL
MAGNESIUM SERPL-MCNC: 2 MG/DL (ref 1.9–2.7)
MICROALBUMIN UR-MCNC: 8.4 MG/L
MICROALBUMIN/CREAT 24H UR: 5 MG/G CREATININE (ref 0–30)
PHOSPHATE SERPL-MCNC: 3 MG/DL (ref 2.7–4.5)
PROT UR-MCNC: 9 MG/DL
PROT/CREAT UR: 0.05 MG/G{CREAT} (ref 0–0.1)
PTH-INTACT SERPL-MCNC: 32 PG/ML (ref 12–88)

## 2024-07-25 PROCEDURE — 83970 ASSAY OF PARATHORMONE: CPT

## 2024-07-25 PROCEDURE — 82570 ASSAY OF URINE CREATININE: CPT

## 2024-07-25 PROCEDURE — 83735 ASSAY OF MAGNESIUM: CPT

## 2024-07-25 PROCEDURE — 84100 ASSAY OF PHOSPHORUS: CPT

## 2024-07-25 PROCEDURE — 82043 UR ALBUMIN QUANTITATIVE: CPT

## 2024-07-25 PROCEDURE — 84156 ASSAY OF PROTEIN URINE: CPT

## 2024-07-25 PROCEDURE — 36415 COLL VENOUS BLD VENIPUNCTURE: CPT

## 2024-07-26 ENCOUNTER — CONSULT (OUTPATIENT)
Dept: NEPHROLOGY | Facility: CLINIC | Age: 49
End: 2024-07-26
Payer: COMMERCIAL

## 2024-07-26 VITALS
OXYGEN SATURATION: 99 % | TEMPERATURE: 97.8 F | BODY MASS INDEX: 23.07 KG/M2 | DIASTOLIC BLOOD PRESSURE: 60 MMHG | WEIGHT: 147 LBS | SYSTOLIC BLOOD PRESSURE: 108 MMHG | HEART RATE: 77 BPM | HEIGHT: 67 IN

## 2024-07-26 DIAGNOSIS — N28.9 KIDNEY DISEASE: ICD-10-CM

## 2024-07-26 DIAGNOSIS — E55.9 VITAMIN D INSUFFICIENCY: ICD-10-CM

## 2024-07-26 DIAGNOSIS — E87.0 HYPERNATREMIA: Primary | ICD-10-CM

## 2024-07-26 DIAGNOSIS — N20.0 NEPHROLITHIASIS: ICD-10-CM

## 2024-07-26 PROCEDURE — 99244 OFF/OP CNSLTJ NEW/EST MOD 40: CPT | Performed by: INTERNAL MEDICINE

## 2024-07-26 NOTE — PROGRESS NOTES
[facility]  Franky Walker DO    Name: Win Roque Jr.  YOB: 1975      Assessment/Plan:    Hypernatremia  Unclear if the patient has an inability to concentrate urine above a certain threshold.  Will check urine studies including urine osmolality and basic metabolic panel at the same time.  Patient is aware that he should purposely not drink water for at least 8-12 hours prior to providing lab samples.    Nephrolithiasis  Patient continue drink at least 2 L of water intake daily, previously PTH levels and other metabolic parameters came back appropriately.  Will consider checking a 24-hour urine depending on progresses from a clinical standpoint.         Problem List Items Addressed This Visit          Genitourinary    Nephrolithiasis     Patient continue drink at least 2 L of water intake daily, previously PTH levels and other metabolic parameters came back appropriately.  Will consider checking a 24-hour urine depending on progresses from a clinical standpoint.            Other    Hypernatremia - Primary     Unclear if the patient has an inability to concentrate urine above a certain threshold.  Will check urine studies including urine osmolality and basic metabolic panel at the same time.  Patient is aware that he should purposely not drink water for at least 8-12 hours prior to providing lab samples.         Relevant Orders    Urinalysis with microscopic    Osmolality, urine    Basic metabolic panel     Other Visit Diagnoses       Kidney disease        Vitamin D insufficiency        Relevant Orders    Vitamin D 25 hydroxy            50 minutes required for this office visit.    We discussed many different matters in depth, some of which are related to nephrology and others were simply to allow me to have a better understanding of his overall care.  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  However, the patient has appropriate kidney function, and this is less likely.    Patient also has a history of nephrolithiasis, he drinks plenty of fluids throughout the day, and most likely drinks at least 2 L of fluids daily.  That being said, the patient also claims to sweat a lot, and I informed him that if this is the case then he may benefit is to measure the amount of urine output daily to ensure that his intake minus sweat produces 2 L of urine throughout the day.    We will discuss findings of the patient's labs as they come through.  If additional testing is indicated we will proceed, otherwise we will see the patient back for regular appointment in 6 months and then go from there.    Subjective:      Patient ID: Win Roque Jr. is a 48 y.o. male.    Patient presents for follow up.    We reviewed labs in detail, most recent creatinine noted to be 1.08 mg/dL, estimate GFR is 80 mL/min.  Serum sodium level was noted to be 144 mmol/L.  Patient claims to reduce a lot of urine throughout the day, he feels that he needs to drink plenty of fluids.  Previously had issues with not having adequate hydration, but after going through history, he may in fact have an issue with both hydration and potentially blood volume.    There were no significant electrolyte abnormalities noted.  Patient is taking all medications as prescribed with no specific side effects and denies use of nonsteroid anti-inflammatory medications.              The following portions of the patient's history were reviewed and updated as appropriate: allergies, current medications, past family history, past medical history, past social history, past surgical history and problem list.    Review of Systems   All other systems reviewed and are  negative.        Social History     Socioeconomic History    Marital status: Single     Spouse name: None    Number of children: None    Years of education: None    Highest education level: None   Occupational History    None   Tobacco Use    Smoking status: Every Day     Current packs/day: 0.50     Average packs/day: 0.5 packs/day for 27.6 years (13.8 ttl pk-yrs)     Types: Cigarettes     Start date: 1997    Smokeless tobacco: Former     Types: Chew     Quit date: 8/5/2016    Tobacco comments:     Would like to discuss patches or something to help quit   Vaping Use    Vaping status: Never Used   Substance and Sexual Activity    Alcohol use: Never    Drug use: Yes     Types: Marijuana     Comment: medical    Sexual activity: None   Other Topics Concern    None   Social History Narrative    ** Merged History Encounter **          Social Determinants of Health     Financial Resource Strain: Not on file   Food Insecurity: Not on file   Transportation Needs: Not on file   Physical Activity: Sufficiently Active (3/25/2021)    Received from [facility]    Exercise Vital Sign     Days of Exercise per Week: 7 days     Minutes of Exercise per Session: 150+ min   Stress: Stress Concern Present (3/25/2021)    Received from [facility]    [employer] [place] [employer] - Occupational Stress Questionnaire     Feeling of Stress : Very much   Social Connections: Not on file   Intimate Partner Violence: Not on file   Housing Stability: Not on file     Past Medical History:   Diagnosis Date    DVT (deep venous thrombosis) (HCC)     hx left lower extremity    Thoracic outlet syndrome     left side     Past Surgical History:   Procedure Laterality Date    CYST REMOVAL      UVULECTOMY       No current outpatient medications on file.    Lab Results   Component Value Date    SODIUM 144 07/10/2024    K 4.1 07/10/2024     07/10/2024    CO2 28 07/10/2024    AGAP 14 (H) 07/10/2024    BUN 14 07/10/2024    CREATININE  "1.08 07/10/2024    GLUC 98 01/31/2023    GLUF 75 07/10/2024    CALCIUM 9.6 07/10/2024    AST 14 07/10/2024    ALT 12 07/10/2024    ALKPHOS 54 07/10/2024    TP 7.3 07/10/2024    TBILI 1.15 (H) 07/10/2024    EGFR 81 07/10/2024     Lab Results   Component Value Date    WBC 15.70 (H) 07/10/2024    HGB 16.4 07/10/2024    HCT 46.5 07/10/2024    MCV 89 07/10/2024     07/10/2024     No results found for: \"CHOLESTEROL\"  No results found for: \"HDL\"  No results found for: \"LDLCALC\"  No results found for: \"TRIG\"  No results found for: \"CHOLHDL\"  Lab Results   Component Value Date    NOP4HRQFDNED 1.483 07/10/2024     Lab Results   Component Value Date    PTH 32.0 07/25/2024    CALCIUM 9.6 07/10/2024    PHOS 3.0 07/25/2024     No results found for: \"SPEP\", \"UPEP\"  No results found for: \"MICROALBUR\", \"[id number]\"        Objective:      /60 (BP Location: Right arm, Patient Position: Sitting, Cuff Size: Standard)   Pulse 77   Temp 97.8 °F (36.6 °C)   Ht 5' 6.75\" (1.695 m)   Wt 66.7 kg (147 lb)   SpO2 99%   BMI 23.20 kg/m²          Physical Exam  Vitals reviewed.   Constitutional:       General: He is not in acute distress.     Appearance: Normal appearance.   HENT:      Head: Normocephalic and atraumatic.      Right Ear: External ear normal.      Left Ear: External ear normal.   Eyes:      Conjunctiva/sclera: Conjunctivae normal.   Cardiovascular:      Rate and Rhythm: Normal rate and regular rhythm.      Heart sounds: Normal heart sounds.   Pulmonary:      Effort: No respiratory distress.      Breath sounds: No wheezing.   Abdominal:      Palpations: Abdomen is soft.   Skin:     General: Skin is warm and dry.   Neurological:      General: No focal deficit present.      Mental Status: He is alert and oriented to person, place, and time.   Psychiatric:         Mood and Affect: Mood normal.         Behavior: Behavior normal.         "

## 2024-07-29 ENCOUNTER — TELEPHONE (OUTPATIENT)
Dept: NEPHROLOGY | Facility: CLINIC | Age: 49
End: 2024-07-29

## 2024-07-29 ENCOUNTER — TELEPHONE (OUTPATIENT)
Dept: HEMATOLOGY ONCOLOGY | Facility: CLINIC | Age: 49
End: 2024-07-29

## 2024-07-29 PROBLEM — E87.0 HYPERNATREMIA: Status: ACTIVE | Noted: 2024-07-29

## 2024-07-29 NOTE — ASSESSMENT & PLAN NOTE
Patient continue drink at least 2 L of water intake daily, previously PTH levels and other metabolic parameters came back appropriately.  Will consider checking a 24-hour urine depending on progresses from a clinical standpoint.

## 2024-07-29 NOTE — TELEPHONE ENCOUNTER
I attempted to call Win but no answer- He doesn't have a VM Box set up to leave a message.    ----- Message from Franky Walker DO sent at 7/29/2024  2:24 PM EDT -----  All labs have come back within normal limits, thankfully.  Very happy with these results.  Does not appear to have a specific medical condition from the lab results that we reviewed which may may put him at risk for different issues.  Specifically he   can concentrate his urine with no issues.

## 2024-07-29 NOTE — ASSESSMENT & PLAN NOTE
Unclear if the patient has an inability to concentrate urine above a certain threshold.  Will check urine studies including urine osmolality and basic metabolic panel at the same time.  Patient is aware that he should purposely not drink water for at least 8-12 hours prior to providing lab samples.

## 2024-07-30 ENCOUNTER — OFFICE VISIT (OUTPATIENT)
Dept: HEMATOLOGY ONCOLOGY | Facility: CLINIC | Age: 49
End: 2024-07-30
Payer: COMMERCIAL

## 2024-07-30 VITALS
SYSTOLIC BLOOD PRESSURE: 104 MMHG | HEART RATE: 71 BPM | DIASTOLIC BLOOD PRESSURE: 62 MMHG | BODY MASS INDEX: 23.63 KG/M2 | OXYGEN SATURATION: 98 % | TEMPERATURE: 98.5 F | HEIGHT: 66 IN | RESPIRATION RATE: 16 BRPM | WEIGHT: 147 LBS

## 2024-07-30 DIAGNOSIS — R59.0 ANTERIOR CERVICAL LYMPHADENOPATHY: ICD-10-CM

## 2024-07-30 DIAGNOSIS — D72.825 BANDEMIA: Primary | ICD-10-CM

## 2024-07-30 PROCEDURE — 99243 OFF/OP CNSLTJ NEW/EST LOW 30: CPT | Performed by: PHYSICIAN ASSISTANT

## 2024-07-30 NOTE — PROGRESS NOTES
"[facility] HEMATOLOGY ONCOLOGY SPECIALISTS [address] [address]  Hematology Ambulatory Consult  Win Roque JrLance, 1975, [id number]  7/30/2024      Assessment and Plan   1. Bandemia  - Leukemia/Lymphoma flow cytometry; Future  - BCR/ABL, Fish Manual; Future  - KAYLAH Screen w/ Reflex to Titer/Pattern; Future  - RF Screen w/ Reflex to Titer; Future  - C-reactive protein; Future  - US head neck soft tissue; Future  - CBC and differential; Future    2. Anterior cervical lymphadenopathy  - US head neck soft tissue; Future    In summary this is a 48-year-old male with multiple comorbidities including chronic pain, osteoarthritis, degenerative disc disease, and others as listed below who is seen in consultation today for leukocytosis.  On chart review he has had a persistent leukocytosis since at least August 2022 with left shift.  His most recent CBC shows WBC 15.7 with mild neutrophilia ANC 10.86.  His hemoglobin and platelet count are normal.  Patient currently is not having any constitutional symptoms.  He was found to have 1 mildly enlarged lymph node and anterior cervical chains right side of neck that he states has been there \"for a while.\"  No recent illness.    Discussion/plan  We discussed the possible etiologies of leukocytosis including primary leukocytosis which entails myeloproliferative disorders and secondary leukocytosis which is more common and seen with infections, inflammation, vasculitis, autoimmune disease, medication etc., tobacco use etc.  I suspect that his chronic leukocytosis is secondary to smoking and chronic pain/inflammation however we will obtain additional testing to explore above etiologies such as chronic viral illness, autoimmune disease, MPN etc.  Smoking cessation advised  Obtain ultrasound of neck for further evaluation of lymphadenopathy  RTC 8 weeks.  I will let the patient know if he needs to be seen in the clinic sooner " "based on above blood work.  Patient advised to notify our office if he has any unexplained fevers, night sweats, unintentional weight loss or increasing lymphadenopathy in the interim.      Patient voiced agreement and understanding to the above.   Patient knows to call the Hematology/Oncology office with any questions and concerns regarding the above.    Barrier(s) to care: None.    Nicolasa Jean PA-C  Medical Oncology/Hematology  [facility]    Subjective   No chief complaint on file.      Referring provider    No referring provider defined for this encounter.    History of present illness: 48-year-old male with past medical history of tobacco use, anxiety disorder, chronic pain, degenerative disc disease, osteoarthritis, nerve pain who presents for evaluation of leukocytosis.  Patient reports he has been referred to our office by his PCP.  On chart review, he has had a persistent leukocytosis since at least August 2022 with left shift.  He denies chronic viral infections such as HIV and hepatitis.  He does have tattoos that were performed to many years ago.  No IV drug use.  He denies any fevers or significant fatigue.  He has had an unintentional weight loss of approximately 5 pounds which he attributes to stress.  He states that he has been gaining weight again recently.  Also notes that he has an enlarged lymph node on the right side of his neck that has been intermittent for \"some time.\"  It has been enlarged now for a few days.  He does work around trees/pollen and has issues with chronic sinusitis and allergies.  No drenching night sweats.  He does not have known COPD or any recent steroid use however he does have he does smoke 4 to 5 cigarettes/day.  Has smoked for 15 years total never more than 1 pack/day. of malignancy or family history of cancer.  Patient admits to issues with chronic pain, osteoarthritis, and joint pain related to trauma in the past.  At 1 point he was on " daily pain medication however has not been on anything recently.  He has pain every day.  No known autoimmune conditions.    Hx VTE LLE 4 years ago. He was Xarelto for 1 year. Treated by [employer] in [facility]. Accident prior on four benavidez?    He cannot have any MRIs due to metal in his eye.    07/10/2024: WBC 15.70, hemoglobin 16.4, platelets 296,000.  ANC 10.86 otherwise WBC is normal. Lyme screen negative.        Review of Systems   Constitutional:  Negative for fatigue, fever and unexpected weight change.   HENT:  Negative for ear pain.    Respiratory:  Negative for shortness of breath.    Cardiovascular:  Negative for chest pain.   Gastrointestinal:  Positive for diarrhea (chronic, intermittent). Negative for blood in stool.        No melena   Genitourinary:  Negative for hematuria.   Musculoskeletal:         Has pain in neck, shoulders    Skin:  Negative for rash.   Hematological:  Positive for adenopathy.   All other systems reviewed and are negative.      Past Medical History:   Diagnosis Date    DVT (deep venous thrombosis) (HCC)     hx left lower extremity    Thoracic outlet syndrome     left side     Past Surgical History:   Procedure Laterality Date    CYST REMOVAL      UVULECTOMY       Family History   Problem Relation Age of Onset    No Known Problems Mother     No Known Problems Father      Social History     Socioeconomic History    Marital status: Single     Spouse name: None    Number of children: None    Years of education: None    Highest education level: None   Occupational History    None   Tobacco Use    Smoking status: Every Day     Current packs/day: 0.50     Average packs/day: 0.5 packs/day for 27.6 years (13.8 ttl pk-yrs)     Types: Cigarettes     Start date: 1997    Smokeless tobacco: Former     Types: Chew     Quit date: 8/5/2016    Tobacco comments:     Would like to discuss patches or something to help quit   Vaping Use    Vaping status: Never Used   Substance and  "Sexual Activity    Alcohol use: Never    Drug use: Yes     Types: Marijuana     Comment: medical    Sexual activity: None   Other Topics Concern    None   Social History Narrative    ** Merged History Encounter **          Social Determinants of Health     Financial Resource Strain: Not on file   Food Insecurity: Not on file   Transportation Needs: Not on file   Physical Activity: Sufficiently Active (3/25/2021)    Received from [facility]    Exercise Vital Sign     Days of Exercise per Week: 7 days     Minutes of Exercise per Session: 150+ min   Stress: Stress Concern Present (3/25/2021)    Received from [facility]    [employer] - Occupational Stress Questionnaire     Feeling of Stress : Very much   Social Connections: Not on file   Intimate Partner Violence: Not on file   Housing Stability: Not on file       No current outpatient medications on file.  Allergies   Allergen Reactions    Amitriptyline     Ibuprofen Fever    Toradol [Ketorolac Tromethamine] Hives    Valproic Acid     Ibuprofen Fever and Rash     Gets very flushed, runs high fever  Other reaction(s): Fever  Gets very flushed, runs high fever  Gets very flushed, runs high fever       Objective   /62 (BP Location: Left arm, Patient Position: Sitting, Cuff Size: Adult)   Pulse 71   Temp 98.5 °F (36.9 °C) (Temporal)   Resp 16   Ht 5' 6\" (1.676 m)   Wt 66.7 kg (147 lb)   SpO2 98%   BMI 23.73 kg/m²   Physical Exam  Vitals reviewed.   HENT:      Head: Normocephalic.   Cardiovascular:      Rate and Rhythm: Normal rate and regular rhythm.      Heart sounds: Normal heart sounds.   Pulmonary:      Effort: Pulmonary effort is normal.      Breath sounds: Normal breath sounds.   Abdominal:      Palpations: Abdomen is soft.      Tenderness: There is no abdominal tenderness.   Musculoskeletal:      Cervical back: Neck supple.   Lymphadenopathy:      Cervical: Cervical adenopathy present.   Skin:     Findings: No " rash.   Neurological:      Mental Status: He is alert.         Result Review  Labs:  Appointment on 07/25/2024   Component Date Value Ref Range Status    Creatinine, Ur 07/25/2024 183.9  Reference range not established. mg/dL Final    Protein Urine Random 07/25/2024 9  Reference range not established. mg/dL Final    Prot/Creat Ratio, Ur 07/25/2024 0.05  0.00 - 0.10 Final    Creatinine, Ur 07/25/2024 183.9  Reference range not established. mg/dL Final    Albumin,U,Random 07/25/2024 8.4  <20.0 mg/L Final    Albumin Creat Ratio 07/25/2024 5  0 - 30 mg/g creatinine Final    Magnesium 07/25/2024 2.0  1.9 - 2.7 mg/dL Final    Phosphorus 07/25/2024 3.0  2.7 - 4.5 mg/dL Final    PTH 07/25/2024 32.0  12.0 - 88.0 pg/mL Final   Appointment on 07/10/2024   Component Date Value Ref Range Status    Hemoglobin A1C 07/10/2024 5.6  Normal 4.0-5.6%; PreDiabetic 5.7-6.4%; Diabetic >=6.5%; Glycemic control for adults with diabetes <7.0% % Final    EAG 07/10/2024 114  mg/dl Final    WBC 07/10/2024 15.70 (H)  4.31 - 10.16 Thousand/uL Final    RBC 07/10/2024 5.23  3.88 - 5.62 Million/uL Final    Hemoglobin 07/10/2024 16.4  12.0 - 17.0 g/dL Final    Hematocrit 07/10/2024 46.5  36.5 - 49.3 % Final    MCV 07/10/2024 89  82 - 98 fL Final    MCH 07/10/2024 31.4  26.8 - 34.3 pg Final    MCHC 07/10/2024 35.3  31.4 - 37.4 g/dL Final    RDW 07/10/2024 12.8  11.6 - 15.1 % Final    MPV 07/10/2024 9.9  8.9 - 12.7 fL Final    Platelets 07/10/2024 296  149 - 390 Thousands/uL Final    nRBC 07/10/2024 0  /100 WBCs Final    Segmented % 07/10/2024 69  43 - 75 % Final    Immature Grans % 07/10/2024 0  0 - 2 % Final    Lymphocytes % 07/10/2024 24  14 - 44 % Final    Monocytes % 07/10/2024 4  4 - 12 % Final    Eosinophils Relative 07/10/2024 2  0 - 6 % Final    Basophils Relative 07/10/2024 1  0 - 1 % Final    Absolute Neutrophils 07/10/2024 10.86 (H)  1.85 - 7.62 Thousands/µL Final    Absolute Immature Grans 07/10/2024 0.05  0.00 - 0.20 Thousand/uL Final     Absolute Lymphocytes 07/10/2024 3.80  0.60 - 4.47 Thousands/µL Final    Absolute Monocytes 07/10/2024 0.64  0.17 - 1.22 Thousand/µL Final    Eosinophils Absolute 07/10/2024 0.25  0.00 - 0.61 Thousand/µL Final    Basophils Absolute 07/10/2024 0.10  0.00 - 0.10 Thousands/µL Final    Sodium 07/10/2024 144  135 - 147 mmol/L Final    Potassium 07/10/2024 4.1  3.5 - 5.3 mmol/L Final    Chloride 07/10/2024 102  96 - 108 mmol/L Final    CO2 07/10/2024 28  21 - 32 mmol/L Final    ANION GAP 07/10/2024 14 (H)  4 - 13 mmol/L Final    BUN 07/10/2024 14  5 - 25 mg/dL Final    Creatinine 07/10/2024 1.08  0.60 - 1.30 mg/dL Final    Standardized to IDMS reference method    Glucose, Fasting 07/10/2024 75  65 - 99 mg/dL Final    Calcium 07/10/2024 9.6  8.4 - 10.2 mg/dL Final    AST 07/10/2024 14  13 - 39 U/L Final    ALT 07/10/2024 12  7 - 52 U/L Final    Specimen collection should occur prior to Sulfasalazine administration due to the potential for falsely depressed results.     Alkaline Phosphatase 07/10/2024 54  34 - 104 U/L Final    Total Protein 07/10/2024 7.3  6.4 - 8.4 g/dL Final    Albumin 07/10/2024 4.5  3.5 - 5.0 g/dL Final    Total Bilirubin 07/10/2024 1.15 (H)  0.20 - 1.00 mg/dL Final    Use of this assay is not recommended for patients undergoing treatment with eltrombopag due to the potential for falsely elevated results.  N-acetyl-p-benzoquinone imine (metabolite of Acetaminophen) will generate erroneously low results in samples for patients that have taken an overdose of Acetaminophen.    eGFR 07/10/2024 81  ml/min/1.73sq m Final    TSH 3RD GENERATON 07/10/2024 1.483  0.450 - 4.500 uIU/mL Final    Adult TSH (3rd generation) reference range follows the recommended guidelines of the American Thyroid Association, January, 2020.    Color, UA 07/10/2024 Light Yellow   Final    Clarity, UA 07/10/2024 Clear   Final    Specific Gravity, UA 07/10/2024 1.009  1.003 - 1.030 Final    pH, UA 07/10/2024 6.0  4.5, 5.0, 5.5, 6.0, 6.5,  7.0, 7.5, 8.0 Final    Leukocytes, UA 07/10/2024 Negative  Negative Final    Nitrite, UA 07/10/2024 Negative  Negative Final    Protein, UA 07/10/2024 Negative  Negative mg/dl Final    Glucose, UA 07/10/2024 Negative  Negative mg/dl Final    Ketones, UA 07/10/2024 Negative  Negative mg/dl Final    Urobilinogen, UA 07/10/2024 <2.0  <2.0 mg/dl mg/dl Final    Bilirubin, UA 07/10/2024 Negative  Negative Final    Occult Blood, UA 07/10/2024 Negative  Negative Final    Lyme Total Antibodies 07/10/2024 Negative  Negative Final       Imaging:     Please note:  This report has been generated by a voice recognition software system. Therefore there may be syntax, spelling, and/or grammatical errors. Please call if you have any questions.

## 2024-07-31 ENCOUNTER — TELEPHONE (OUTPATIENT)
Age: 49
End: 2024-07-31

## 2024-07-31 NOTE — TELEPHONE ENCOUNTER
Patient is complaining of stomach cramping, bowel cramping, and diarrhea.  He eats a lot of leafy greens and meats.  He saw something on the news recently that this can contain e coli,  salmonella, or listeria. He would like to do a lab test for the above.   He would also like to know if there are any other test that could be done to test for parasites?  Patient is just trying to narrow down as to what is going on.    Patient is going to the lab tomorrow to have other lab work done.    Please advise, thank you.

## 2024-07-31 NOTE — TELEPHONE ENCOUNTER
I tried to call patient but got no answer  Unable to leave voice mail.   Voicemail box not set up

## 2024-08-02 ENCOUNTER — TELEPHONE (OUTPATIENT)
Age: 49
End: 2024-08-02

## 2024-08-02 NOTE — TELEPHONE ENCOUNTER
Patient spoke with GI today to schedule a colonoscopy and was told about a brand new blood test called Shield to screen for colon cancer that he is interested in.  Zeenat from GI told patient to call our office to have the lab ordered, but since it is so brand new it may not be built into the system yet.  He was instructed to call GI back if Dr. Tapia is unable to order this.  Please advise.  He would like a call back either way.

## 2024-08-19 ENCOUNTER — TELEPHONE (OUTPATIENT)
Age: 49
End: 2024-08-19

## 2024-08-30 ENCOUNTER — OFFICE VISIT (OUTPATIENT)
Dept: FAMILY MEDICINE CLINIC | Facility: CLINIC | Age: 49
End: 2024-08-30
Payer: COMMERCIAL

## 2024-08-30 ENCOUNTER — APPOINTMENT (OUTPATIENT)
Dept: LAB | Facility: CLINIC | Age: 49
End: 2024-08-30
Payer: COMMERCIAL

## 2024-08-30 VITALS
HEART RATE: 63 BPM | TEMPERATURE: 97.1 F | BODY MASS INDEX: 24.04 KG/M2 | DIASTOLIC BLOOD PRESSURE: 69 MMHG | WEIGHT: 149.6 LBS | HEIGHT: 66 IN | OXYGEN SATURATION: 100 % | SYSTOLIC BLOOD PRESSURE: 111 MMHG | RESPIRATION RATE: 18 BRPM

## 2024-08-30 DIAGNOSIS — F17.210 CIGARETTE NICOTINE DEPENDENCE WITHOUT COMPLICATION: Primary | ICD-10-CM

## 2024-08-30 DIAGNOSIS — F41.1 GENERALIZED ANXIETY DISORDER: ICD-10-CM

## 2024-08-30 DIAGNOSIS — Z00.00 ANNUAL PHYSICAL EXAM: ICD-10-CM

## 2024-08-30 DIAGNOSIS — E87.0 HYPERNATREMIA: ICD-10-CM

## 2024-08-30 DIAGNOSIS — N20.0 NEPHROLITHIASIS: ICD-10-CM

## 2024-08-30 DIAGNOSIS — E55.9 VITAMIN D INSUFFICIENCY: ICD-10-CM

## 2024-08-30 LAB
25(OH)D3 SERPL-MCNC: 21.9 NG/ML (ref 30–100)
AMORPH URATE CRY URNS QL MICRO: ABNORMAL
ANION GAP SERPL CALCULATED.3IONS-SCNC: 8 MMOL/L (ref 4–13)
BACTERIA UR QL AUTO: ABNORMAL /HPF
BILIRUB UR QL STRIP: NEGATIVE
BUN SERPL-MCNC: 18 MG/DL (ref 5–25)
CALCIUM SERPL-MCNC: 9.2 MG/DL (ref 8.4–10.2)
CHLORIDE SERPL-SCNC: 105 MMOL/L (ref 96–108)
CLARITY UR: ABNORMAL
CO2 SERPL-SCNC: 28 MMOL/L (ref 21–32)
COLOR UR: YELLOW
CREAT SERPL-MCNC: 0.99 MG/DL (ref 0.6–1.3)
GFR SERPL CREATININE-BSD FRML MDRD: 90 ML/MIN/1.73SQ M
GLUCOSE P FAST SERPL-MCNC: 98 MG/DL (ref 65–99)
GLUCOSE UR STRIP-MCNC: NEGATIVE MG/DL
HGB UR QL STRIP.AUTO: NEGATIVE
KETONES UR STRIP-MCNC: NEGATIVE MG/DL
LEUKOCYTE ESTERASE UR QL STRIP: NEGATIVE
MUCOUS THREADS UR QL AUTO: ABNORMAL
NITRITE UR QL STRIP: NEGATIVE
NON-SQ EPI CELLS URNS QL MICRO: ABNORMAL /HPF
OSMOLALITY UR: 938 MMOL/KG
PH UR STRIP.AUTO: 7 [PH]
POTASSIUM SERPL-SCNC: 4.1 MMOL/L (ref 3.5–5.3)
PROT UR STRIP-MCNC: ABNORMAL MG/DL
RBC #/AREA URNS AUTO: ABNORMAL /HPF
SODIUM SERPL-SCNC: 141 MMOL/L (ref 135–147)
SP GR UR STRIP.AUTO: 1.03 (ref 1–1.03)
UROBILINOGEN UR STRIP-ACNC: <2 MG/DL
WBC #/AREA URNS AUTO: ABNORMAL /HPF

## 2024-08-30 PROCEDURE — 81001 URINALYSIS AUTO W/SCOPE: CPT

## 2024-08-30 PROCEDURE — 83935 ASSAY OF URINE OSMOLALITY: CPT

## 2024-08-30 PROCEDURE — 99396 PREV VISIT EST AGE 40-64: CPT | Performed by: FAMILY MEDICINE

## 2024-08-30 PROCEDURE — 36415 COLL VENOUS BLD VENIPUNCTURE: CPT

## 2024-08-30 PROCEDURE — 80048 BASIC METABOLIC PNL TOTAL CA: CPT

## 2024-08-30 PROCEDURE — 82306 VITAMIN D 25 HYDROXY: CPT

## 2024-08-30 RX ORDER — AMOXICILLIN 500 MG/1
1000 TABLET, FILM COATED ORAL 2 TIMES DAILY
Qty: 28 TABLET | Refills: 3 | Status: SHIPPED | OUTPATIENT
Start: 2024-08-30 | End: 2024-09-06

## 2024-08-30 RX ORDER — NICOTINE 21 MG/24HR
1 PATCH, TRANSDERMAL 24 HOURS TRANSDERMAL EVERY 24 HOURS
Qty: 28 PATCH | Refills: 3 | Status: SHIPPED | OUTPATIENT
Start: 2024-08-30

## 2024-08-30 NOTE — PROGRESS NOTES
Adult Annual Physical  Name: Win Roque Jr.      : 1975      MRN: [id number]  Encounter Provider: Niraj Tapia DO  Encounter Date: 2024   Encounter department: [facility]    Assessment & Plan   1. Cigarette nicotine dependence without complication  Assessment & Plan:  Requesting nicotine patches will send prescription and at this time follow-up with me annually  2. Generalized anxiety disorder  Assessment & Plan:  Stable overall no active medication at this time patient is back to work  3. Nephrolithiasis  Assessment & Plan:  Urinalysis to be evaluated done this morning lab results are pending  4. Annual physical exam  -     CBC and differential; Future  -     Comprehensive metabolic panel; Future  -     Lipid panel; Future  -     TSH, 3rd generation with Free T4 reflex; Future  -     PSA, total and free; Future    Immunizations and preventive care screenings were discussed with patient today. Appropriate education was printed on patient's after visit summary.    Discussed risks and benefits of prostate cancer screening. We discussed the controversial history of PSA screening for prostate cancer in the [place] as well as the risk of over detection and over treatment of prostate cancer by way of PSA screening.  The patient understands that PSA blood testing is an imperfect way to screen for prostate cancer and that elevated PSA levels in the blood may also be caused by infection, inflammation, prostatic trauma or manipulation, urological procedures, or by benign prostatic enlargement.    The role of the digital rectal examination in prostate cancer screening was also discussed and I discussed with him that there is large interobserver variability in the findings of digital rectal examination.    Counseling:  Alcohol/drug use: discussed moderation in alcohol intake, the recommendations for healthy alcohol use, and avoidance of illicit drug use.  Dental  "Health: discussed importance of regular tooth brushing, flossing, and dental visits.  Injury prevention: discussed safety/seat belts, safety helmets, smoke detectors, carbon dioxide detectors, and smoking near bedding or upholstery.  Sexual health: discussed sexually transmitted diseases, partner selection, use of condoms, avoidance of unintended pregnancy, and contraceptive alternatives.  Exercise: the importance of regular exercise/physical activity was discussed. Recommend exercise 3-5 times per week for at least 30 minutes.          History of Present Illness     Adult Annual Physical  Review of Systems   Constitutional:  Negative for chills, fatigue and fever.   HENT:  Negative for congestion, nosebleeds, rhinorrhea, sinus pressure and sore throat.    Eyes:  Negative for discharge and redness.   Respiratory:  Negative for cough and shortness of breath.    Cardiovascular:  Negative for chest pain, palpitations and leg swelling.   Gastrointestinal:  Negative for abdominal pain, blood in stool and nausea.   Endocrine: Negative for cold intolerance, heat intolerance and polyuria.   Genitourinary:  Negative for dysuria and frequency.   Musculoskeletal:  Negative for arthralgias, back pain and myalgias.   Skin:  Negative for rash.   Neurological:  Negative for dizziness, weakness and headaches.   Hematological:  Negative for adenopathy.   Psychiatric/Behavioral:  Negative for behavioral problems and sleep disturbance. The patient is not nervous/anxious.          Objective     /69 (BP Location: Right arm, Patient Position: Sitting, Cuff Size: Standard)   Pulse 63   Temp (!) 97.1 °F (36.2 °C) (Tympanic)   Resp 18   Ht 5' 6\" (1.676 m)   Wt 67.9 kg (149 lb 9.6 oz)   SpO2 100%   BMI 24.15 kg/m²     Physical Exam  Vitals and nursing note reviewed.   Constitutional:       Appearance: Normal appearance. He is well-developed.   HENT:      Head: Normocephalic and atraumatic.      Right Ear: Tympanic membrane and " external ear normal.      Left Ear: Tympanic membrane and external ear normal.      Nose: Nose normal.      Mouth/Throat:      Mouth: Mucous membranes are moist.   Eyes:      General: No scleral icterus.     Conjunctiva/sclera: Conjunctivae normal.      Pupils: Pupils are equal, round, and reactive to light.   Neck:      Thyroid: No thyromegaly.      Vascular: No JVD.   Cardiovascular:      Rate and Rhythm: Normal rate and regular rhythm.      Heart sounds: Normal heart sounds. No murmur heard.  Pulmonary:      Effort: Pulmonary effort is normal.      Breath sounds: Normal breath sounds. No wheezing or rales.   Chest:      Chest wall: No tenderness.   Abdominal:      General: Bowel sounds are normal. There is no distension.      Palpations: Abdomen is soft. There is no mass.      Tenderness: There is no abdominal tenderness. There is no guarding or rebound.   Musculoskeletal:         General: No tenderness or deformity. Normal range of motion.      Cervical back: Normal range of motion and neck supple.   Lymphadenopathy:      Cervical: No cervical adenopathy.   Skin:     General: Skin is warm and dry.      Capillary Refill: Capillary refill takes less than 2 seconds.      Findings: No erythema or rash.   Neurological:      General: No focal deficit present.      Mental Status: He is alert and oriented to person, place, and time.      Cranial Nerves: No cranial nerve deficit.      Deep Tendon Reflexes: Reflexes are normal and symmetric. Reflexes normal.   Psychiatric:         Mood and Affect: Mood normal.         Behavior: Behavior normal.         Thought Content: Thought content normal.         Judgment: Judgment normal.

## 2024-09-03 ENCOUNTER — TELEPHONE (OUTPATIENT)
Dept: NEPHROLOGY | Facility: CLINIC | Age: 49
End: 2024-09-03

## 2024-09-03 NOTE — TELEPHONE ENCOUNTER
I called and left a VM to please call the office back to discuss the following:    ----- Message from CHRISTIANO Cross sent at 9/3/2024  9:46 AM EDT -----  Serum sodium is appropriate on labs done 4 days ago. Please confirm that he did not hydrate with any type of fluid for at least 8-12 hours prior to the lab draw     Please also let him know that vitamin D is low and he can start cholecalciferol 2000 units daily over the counter if he is not already on supplement

## 2024-09-04 ENCOUNTER — APPOINTMENT (OUTPATIENT)
Dept: LAB | Facility: CLINIC | Age: 49
End: 2024-09-04
Payer: COMMERCIAL

## 2024-09-04 DIAGNOSIS — D72.825 BANDEMIA: ICD-10-CM

## 2024-09-04 LAB
BASOPHILS # BLD AUTO: 0.08 THOUSANDS/ÂΜL (ref 0–0.1)
BASOPHILS NFR BLD AUTO: 1 % (ref 0–1)
CRP SERPL QL: <1 MG/L
EOSINOPHIL # BLD AUTO: 0.25 THOUSAND/ÂΜL (ref 0–0.61)
EOSINOPHIL NFR BLD AUTO: 2 % (ref 0–6)
ERYTHROCYTE [DISTWIDTH] IN BLOOD BY AUTOMATED COUNT: 13.4 % (ref 11.6–15.1)
HCT VFR BLD AUTO: 47 % (ref 36.5–49.3)
HGB BLD-MCNC: 15.9 G/DL (ref 12–17)
IMM GRANULOCYTES # BLD AUTO: 0.03 THOUSAND/UL (ref 0–0.2)
IMM GRANULOCYTES NFR BLD AUTO: 0 % (ref 0–2)
LYMPHOCYTES # BLD AUTO: 3.41 THOUSANDS/ÂΜL (ref 0.6–4.47)
LYMPHOCYTES NFR BLD AUTO: 30 % (ref 14–44)
MCH RBC QN AUTO: 31.4 PG (ref 26.8–34.3)
MCHC RBC AUTO-ENTMCNC: 33.8 G/DL (ref 31.4–37.4)
MCV RBC AUTO: 93 FL (ref 82–98)
MONOCYTES # BLD AUTO: 0.59 THOUSAND/ÂΜL (ref 0.17–1.22)
MONOCYTES NFR BLD AUTO: 5 % (ref 4–12)
NEUTROPHILS # BLD AUTO: 7.19 THOUSANDS/ÂΜL (ref 1.85–7.62)
NEUTS SEG NFR BLD AUTO: 62 % (ref 43–75)
NRBC BLD AUTO-RTO: 0 /100 WBCS
PLATELET # BLD AUTO: 298 THOUSANDS/UL (ref 149–390)
PMV BLD AUTO: 9.6 FL (ref 8.9–12.7)
RBC # BLD AUTO: 5.07 MILLION/UL (ref 3.88–5.62)
WBC # BLD AUTO: 11.55 THOUSAND/UL (ref 4.31–10.16)

## 2024-09-04 PROCEDURE — 86038 ANTINUCLEAR ANTIBODIES: CPT

## 2024-09-04 PROCEDURE — 88185 FLOWCYTOMETRY/TC ADD-ON: CPT

## 2024-09-04 PROCEDURE — 36415 COLL VENOUS BLD VENIPUNCTURE: CPT

## 2024-09-04 PROCEDURE — 88184 FLOWCYTOMETRY/ TC 1 MARKER: CPT

## 2024-09-04 PROCEDURE — 85025 COMPLETE CBC W/AUTO DIFF WBC: CPT

## 2024-09-04 PROCEDURE — 88374 M/PHMTRC ALYS ISHQUANT/SEMIQ: CPT

## 2024-09-04 PROCEDURE — 86430 RHEUMATOID FACTOR TEST QUAL: CPT

## 2024-09-04 PROCEDURE — 86140 C-REACTIVE PROTEIN: CPT

## 2024-09-04 NOTE — TELEPHONE ENCOUNTER
Patient returning call and made aware:    - Message from CHRISTIANO Cross sent at 9/3/2024  9:46 AM EDT -----  Serum sodium is appropriate on labs done 4 days ago. Please confirm that he did not hydrate with any type of fluid for at least 8-12 hours prior to the lab draw      Please also let him know that vitamin D is low and he can start cholecalciferol 2000 units daily over the counter if he is not already on supplement    Patient verbalized understanding. Patient confirms he did not have any fluids for 8 hours prior to lab draw.

## 2024-09-04 NOTE — TELEPHONE ENCOUNTER
I called and spoke with Win. He is aware of what medication to buy at [facility] and how much of a dose. He states he will buy them and start the supplement.

## 2024-09-04 NOTE — TELEPHONE ENCOUNTER
Pt called back he needs to know what to buy. Please call him.  He was at [facility] and they only had vitamin d3.

## 2024-09-05 LAB
ANA SER QL IA: NEGATIVE
RHEUMATOID FACT SER QL LA: NEGATIVE

## 2024-09-08 LAB — SCAN RESULT: NORMAL

## 2024-09-09 LAB — SCAN RESULT: NORMAL

## 2024-10-08 ENCOUNTER — TELEPHONE (OUTPATIENT)
Dept: HEMATOLOGY ONCOLOGY | Facility: CLINIC | Age: 49
End: 2024-10-08